# Patient Record
Sex: MALE | Race: WHITE | Employment: OTHER | URBAN - NONMETROPOLITAN AREA
[De-identification: names, ages, dates, MRNs, and addresses within clinical notes are randomized per-mention and may not be internally consistent; named-entity substitution may affect disease eponyms.]

---

## 2019-05-31 ENCOUNTER — OFFICE VISIT (OUTPATIENT)
Dept: FAMILY MEDICINE CLINIC | Age: 69
End: 2019-05-31
Payer: MEDICARE

## 2019-05-31 VITALS
HEIGHT: 75 IN | TEMPERATURE: 98.7 F | SYSTOLIC BLOOD PRESSURE: 122 MMHG | BODY MASS INDEX: 28.89 KG/M2 | OXYGEN SATURATION: 99 % | WEIGHT: 232.38 LBS | DIASTOLIC BLOOD PRESSURE: 80 MMHG | HEART RATE: 60 BPM

## 2019-05-31 DIAGNOSIS — L03.115 CELLULITIS OF RIGHT LOWER EXTREMITY: Primary | ICD-10-CM

## 2019-05-31 PROCEDURE — 99213 OFFICE O/P EST LOW 20 MIN: CPT | Performed by: FAMILY MEDICINE

## 2019-05-31 RX ORDER — LOSARTAN POTASSIUM 50 MG/1
TABLET ORAL
COMMUNITY
Start: 2019-05-22

## 2019-05-31 RX ORDER — ATORVASTATIN CALCIUM 10 MG/1
TABLET, FILM COATED ORAL
COMMUNITY
Start: 2019-03-15

## 2019-05-31 RX ORDER — PANTOPRAZOLE SODIUM 40 MG/1
TABLET, DELAYED RELEASE ORAL
COMMUNITY
Start: 2019-03-15

## 2019-05-31 RX ORDER — CEPHALEXIN 500 MG/1
500 CAPSULE ORAL 3 TIMES DAILY
Qty: 30 CAPSULE | Refills: 0 | Status: SHIPPED | OUTPATIENT
Start: 2019-05-31 | End: 2019-06-10

## 2019-05-31 NOTE — PROGRESS NOTES
5/31/19    Name: Edd Sni Work  YDW:[id number]   Sex:male   Age:69 y.o. Subjective:  Chief Complaint   Patient presents with    Leg Injury     skin tear/abrasion to right lower leg a week ago. not healing     Patient pressents with cut on right lower leg that was healing then he bumped it and re opened it and proceeded to use a new set of bandaids and after just the first bandaid he now has a red square around the wound from it  But leg is now getting red as well around where the initial wound is  He also has noticed it is more sore this morning    ROS:    As above, all other pertinent systems negative. Current Outpatient Medications:     cephALEXin (KEFLEX) 500 MG capsule, Take 1 capsule by mouth 3 times daily for 10 days, Disp: 30 capsule, Rfl: 0    pantoprazole (PROTONIX) 40 MG tablet, , Disp: , Rfl:     losartan (COZAAR) 50 MG tablet, , Disp: , Rfl:     atorvastatin (LIPITOR) 10 MG tablet, , Disp: , Rfl:   No Known Allergies     /80   Pulse 60   Temp 98.7 °F (37.1 °C)   Ht 6' 3\" (1.905 m)   Wt 232 lb 6 oz (105.4 kg)   SpO2 99%   BMI 29.04 kg/m²     EXAM:   Physical Exam   Constitutional: He appears well-developed and well-nourished. HENT:   Head: Normocephalic and atraumatic. Eyes: Pupils are equal, round, and reactive to light. EOM are normal.   Neck: Normal range of motion. Cardiovascular: Normal rate and regular rhythm. Pulmonary/Chest: Effort normal and breath sounds normal.   Skin: Skin is warm and dry. Right lower leg wound about size of viridiana that kings healing but red. Red outline square around the wound  Tenderness in the area  Mild swellign as well   Nursing note and vitals reviewed. Edd Sin was seen today for leg injury. Diagnoses and all orders for this visit:    Cellulitis of right lower extremity  -     cephALEXin (KEFLEX) 500 MG capsule;  Take 1 capsule by mouth 3 times daily for 10 days    keep open to heal  Elevate leg  If not feeling better in 2 to 3 days needs to follow up        Seen by:   Pratima Ye, DO

## 2019-07-01 ENCOUNTER — OFFICE VISIT (OUTPATIENT)
Dept: FAMILY MEDICINE CLINIC | Age: 69
End: 2019-07-01
Payer: MEDICARE

## 2019-07-01 VITALS
SYSTOLIC BLOOD PRESSURE: 124 MMHG | HEIGHT: 75 IN | OXYGEN SATURATION: 96 % | BODY MASS INDEX: 29.09 KG/M2 | TEMPERATURE: 97.7 F | DIASTOLIC BLOOD PRESSURE: 80 MMHG | WEIGHT: 234 LBS | HEART RATE: 72 BPM

## 2019-07-01 DIAGNOSIS — R73.9 HYPERGLYCEMIA: ICD-10-CM

## 2019-07-01 DIAGNOSIS — B35.3 TINEA PEDIS OF BOTH FEET: ICD-10-CM

## 2019-07-01 LAB
CHP ED QC CHECK: YES
GLUCOSE BLD-MCNC: 148 MG/DL

## 2019-07-01 PROCEDURE — 82962 GLUCOSE BLOOD TEST: CPT | Performed by: FAMILY MEDICINE

## 2019-07-01 PROCEDURE — 99213 OFFICE O/P EST LOW 20 MIN: CPT | Performed by: FAMILY MEDICINE

## 2019-07-01 RX ORDER — TERBINAFINE HYDROCHLORIDE 250 MG/1
250 TABLET ORAL DAILY
Qty: 14 TABLET | Refills: 0 | Status: SHIPPED | OUTPATIENT
Start: 2019-07-01 | End: 2019-07-15

## 2019-07-01 NOTE — PROGRESS NOTES
OFFICE NOTE    7/1/19  Name: Aman Vogt Work  APV:8[id number]   Sex:male   Age:69 y.o. SUBJECTIVE  Chief Complaint   Patient presents with    Rash     both feet - started about a month ago        HPI  Rash on feet for about a mos. Not pruritic, Having knee replacement in about a mos thinks this might cause them to cancel surgery        Review of Systems   No claudication symptoms. Not diabetic. No fever or chills        Current Outpatient Medications:     pantoprazole (PROTONIX) 40 MG tablet, , Disp: , Rfl:     losartan (COZAAR) 50 MG tablet, , Disp: , Rfl:     atorvastatin (LIPITOR) 10 MG tablet, , Disp: , Rfl:   No Known Allergies    No past medical history on file. No past surgical history on file. No family history on file. Social History     Tobacco History     Smoking Status  Never Smoker    Smokeless Tobacco Use  Never Used          Alcohol History     Alcohol Use Status  Not Asked          Drug Use     Drug Use Status  Not Asked          Sexual Activity     Sexually Active  Not Asked                OBJECTIVE  Vitals:    07/01/19 1309   BP: 124/80   Pulse: 72   Temp: 97.7 °F (36.5 °C)   SpO2: 96%   Weight: 234 lb (106.1 kg)   Height: 6' 3\" (1.905 m)        Body mass index is 29.25 kg/m². Patient is overweight    No orders of the defined types were placed in this encounter. EXAM   Physical Exam   Cardiovascular: Normal rate and regular rhythm. Pulmonary/Chest: Effort normal and breath sounds normal.   Musculoskeletal: Normal range of motion. Advanced OA both knees. Purplish discoloration of toes and distal fee. Pulses diminished, cap filling normal, tinea pedis evident         Aman Vogt was seen today for rash. Diagnoses and all orders for this visit:    Tinea pedis of both feet  Looks like severe tinea pedis. Lamisil 200 mg per day for 2 weeks and podiatry referral. On atorvastatin will hold while on Lamisil. Hyperglycemia  Random  may be early DM.  Will need to f/u with PMD for this          No follow-ups on file.     Electronically signed by Ajith Gallardo MD on 7/1/19 at 1:23 PM

## 2019-07-02 ENCOUNTER — OFFICE VISIT (OUTPATIENT)
Dept: PODIATRY | Age: 69
End: 2019-07-02
Payer: MEDICARE

## 2019-07-02 VITALS — BODY MASS INDEX: 28.97 KG/M2 | WEIGHT: 233 LBS | RESPIRATION RATE: 20 BRPM | HEIGHT: 75 IN

## 2019-07-02 DIAGNOSIS — L23.89 ALLERGIC CONTACT DERMATITIS DUE TO FOOTWEAR: ICD-10-CM

## 2019-07-02 DIAGNOSIS — B35.3 TINEA PEDIS OF BOTH FEET: Primary | ICD-10-CM

## 2019-07-02 PROCEDURE — 99203 OFFICE O/P NEW LOW 30 MIN: CPT | Performed by: PODIATRIST

## 2019-07-02 RX ORDER — PRENATAL VIT 91/IRON/FOLIC/DHA 28-975-200
COMBINATION PACKAGE (EA) ORAL
Qty: 1 TUBE | Refills: 2 | Status: SHIPPED | OUTPATIENT
Start: 2019-07-02 | End: 2019-09-24

## 2019-07-02 RX ORDER — BETAMETHASONE DIPROPIONATE 0.5 MG/G
CREAM TOPICAL
Qty: 1 TUBE | Refills: 2 | Status: SHIPPED | OUTPATIENT
Start: 2019-07-02 | End: 2019-09-24

## 2019-07-23 ENCOUNTER — PROCEDURE VISIT (OUTPATIENT)
Dept: PODIATRY | Age: 69
End: 2019-07-23
Payer: MEDICARE

## 2019-07-23 VITALS — BODY MASS INDEX: 28.97 KG/M2 | RESPIRATION RATE: 18 BRPM | WEIGHT: 233 LBS | HEIGHT: 75 IN

## 2019-07-23 DIAGNOSIS — L23.89 ALLERGIC CONTACT DERMATITIS DUE TO FOOTWEAR: ICD-10-CM

## 2019-07-23 DIAGNOSIS — B35.3 TINEA PEDIS OF BOTH FEET: Primary | ICD-10-CM

## 2019-07-23 DIAGNOSIS — B35.1 TINEA UNGUIUM: ICD-10-CM

## 2019-07-23 PROCEDURE — 99213 OFFICE O/P EST LOW 20 MIN: CPT | Performed by: PODIATRIST

## 2019-07-23 RX ORDER — BETAMETHASONE DIPROPIONATE 0.5 MG/G
CREAM TOPICAL
Qty: 1 TUBE | Refills: 2 | Status: SHIPPED | OUTPATIENT
Start: 2019-07-23 | End: 2019-12-03

## 2019-07-23 RX ORDER — PRENATAL VIT 91/IRON/FOLIC/DHA 28-975-200
COMBINATION PACKAGE (EA) ORAL
Qty: 1 TUBE | Refills: 2 | Status: SHIPPED | OUTPATIENT
Start: 2019-07-23 | End: 2019-09-24

## 2019-07-23 RX ORDER — LEVOCETIRIZINE DIHYDROCHLORIDE 5 MG/1
TABLET, FILM COATED ORAL
COMMUNITY

## 2019-09-24 ENCOUNTER — OFFICE VISIT (OUTPATIENT)
Dept: PODIATRY | Age: 69
End: 2019-09-24
Payer: MEDICARE

## 2019-09-24 VITALS
HEIGHT: 75 IN | TEMPERATURE: 97 F | HEART RATE: 87 BPM | BODY MASS INDEX: 28.6 KG/M2 | WEIGHT: 230 LBS | OXYGEN SATURATION: 97 %

## 2019-09-24 DIAGNOSIS — B35.3 TINEA PEDIS OF BOTH FEET: ICD-10-CM

## 2019-09-24 DIAGNOSIS — L84 CORNS AND CALLOSITIES: ICD-10-CM

## 2019-09-24 DIAGNOSIS — G60.8 HEREDITARY SENSORY NEUROPATHY: ICD-10-CM

## 2019-09-24 DIAGNOSIS — B35.1 TINEA UNGUIUM: Primary | ICD-10-CM

## 2019-09-24 PROCEDURE — 11721 DEBRIDE NAIL 6 OR MORE: CPT | Performed by: PODIATRIST

## 2019-09-24 PROCEDURE — 11056 PARNG/CUTG B9 HYPRKR LES 2-4: CPT | Performed by: PODIATRIST

## 2019-09-24 PROCEDURE — 99999 PR OFFICE/OUTPT VISIT,PROCEDURE ONLY: CPT | Performed by: PODIATRIST

## 2019-09-24 NOTE — PROGRESS NOTES
Kiko Work is here today for nail care. his PCP is Omega Reagan MD last OV was  19.        19  Kiko Work : 1950 Sex: male  Age: 78 y.o. Patient was referred by Omega Reagan MD    CC:    Follow-up Red rash both right and left feet  Follow-up painful elongated toenails 1 through 5 right and left    HPI:   Follow-up tinea pedis bilateral plantar feet. Follow-up painful elongated toenails 1 through 5 right and left  Denies any open wounds. Has been progressing very well for left total knee replacement when she had this past July scheduled for total knee replacement right knee in the next month. Denies any foot or ankle pain today. No additional pedal complaints at this time. ROS:  Const: Denies constitutional symptoms  Musculo: Denies symptoms other than stated above  Skin: Denies symptoms other than stated above       Current Outpatient Medications:     levocetirizine (XYZAL) 5 MG tablet, Take by mouth, Disp: , Rfl:     betamethasone dipropionate (DIPROLENE) 0.05 % cream, Apply to affected area topically 2 times daily. , Disp: 1 Tube, Rfl: 2    pantoprazole (PROTONIX) 40 MG tablet, , Disp: , Rfl:     losartan (COZAAR) 50 MG tablet, , Disp: , Rfl:     atorvastatin (LIPITOR) 10 MG tablet, , Disp: , Rfl:   No Known Allergies    No past medical history on file. Vitals:    19 0816   Pulse: 87   Temp: 97 °F (36.1 °C)   TempSrc: Temporal   SpO2: 97%   Weight: 230 lb (104.3 kg)   Height: 6' 3\" (1.905 m)       Work History/Social History: [facility] Dr. Cheryl Brewer left 2019/ 2019. [place]    Focused Lower Extremity Physical Exam:    Neurovascular examination:    Dorsalis Pedis palpable bilateral.  Posterior tibialis palpable bilateral.    Capillary Refill Time:  Immediate return  Hair growth:  Symmetrical and bilateral   Skin:  Not atrophic  Edema: No edema bilateral feet or ankles.   Neurologic:  Light touch intact bilateral.      Musculoskeletal/ Orthopedic examination:

## 2019-12-03 ENCOUNTER — PROCEDURE VISIT (OUTPATIENT)
Dept: PODIATRY | Age: 69
End: 2019-12-03
Payer: MEDICARE

## 2019-12-03 DIAGNOSIS — B35.1 TINEA UNGUIUM: Primary | ICD-10-CM

## 2019-12-03 DIAGNOSIS — L84 CORNS AND CALLOSITIES: ICD-10-CM

## 2019-12-03 DIAGNOSIS — G60.8 HEREDITARY SENSORY NEUROPATHY: ICD-10-CM

## 2019-12-03 PROCEDURE — 11721 DEBRIDE NAIL 6 OR MORE: CPT | Performed by: PODIATRIST

## 2019-12-03 PROCEDURE — 11056 PARNG/CUTG B9 HYPRKR LES 2-4: CPT | Performed by: PODIATRIST

## 2020-05-27 ENCOUNTER — PROCEDURE VISIT (OUTPATIENT)
Dept: PODIATRY | Age: 70
End: 2020-05-27
Payer: MEDICARE

## 2020-05-27 VITALS — BODY MASS INDEX: 29.22 KG/M2 | WEIGHT: 235 LBS | HEIGHT: 75 IN

## 2020-05-27 PROCEDURE — 11721 DEBRIDE NAIL 6 OR MORE: CPT | Performed by: PODIATRIST

## 2020-05-27 PROCEDURE — 11056 PARNG/CUTG B9 HYPRKR LES 2-4: CPT | Performed by: PODIATRIST

## 2020-07-29 ENCOUNTER — PROCEDURE VISIT (OUTPATIENT)
Dept: PODIATRY | Age: 70
End: 2020-07-29
Payer: MEDICARE

## 2020-07-29 PROCEDURE — 11721 DEBRIDE NAIL 6 OR MORE: CPT | Performed by: PODIATRIST

## 2020-07-29 PROCEDURE — 11056 PARNG/CUTG B9 HYPRKR LES 2-4: CPT | Performed by: PODIATRIST

## 2020-11-10 ENCOUNTER — PROCEDURE VISIT (OUTPATIENT)
Dept: PODIATRY | Age: 70
End: 2020-11-10
Payer: MEDICARE

## 2020-11-10 PROCEDURE — 11056 PARNG/CUTG B9 HYPRKR LES 2-4: CPT | Performed by: PODIATRIST

## 2020-11-10 PROCEDURE — 11721 DEBRIDE NAIL 6 OR MORE: CPT | Performed by: PODIATRIST

## 2020-11-10 NOTE — PROGRESS NOTES
lesions. There is also hyperkeratotic tissue medial IPJ great toe bilateral.    Assessment and Plan:  Shyla Jackson was seen today for callouses and nail problem. Diagnoses and all orders for this visit:    Tinea unguium    Corns and callosities    Hereditary sensory neuropathy    Tinea pedis of both feet          Patient seen follow-up today for painful elongated toenails 1 through 5 right and left  Manual debridement with standard technique toenails 1 through 5 right and left in thickness and length. Patient tolerated procedure well. Paring of hyperkeratotic tissue with #15 blade plantar 1st metatarsal head bilateral.  I did also pare hyperkeratotic tissue medial IPJ great toe bilateral.  Patient tolerated well. Did recommend daily lotion. Importance avoiding barefoot. I will follow-up 2 months. Return in about 2 months (around 1/10/2021). Seen By:  Mayur Alaniz DPM      Document was created using voice recognition software. Note was reviewed, however may contain grammatical errors.

## 2021-01-12 ENCOUNTER — PROCEDURE VISIT (OUTPATIENT)
Dept: PODIATRY | Age: 71
End: 2021-01-12
Payer: MEDICARE

## 2021-01-12 DIAGNOSIS — B35.1 TINEA UNGUIUM: Primary | ICD-10-CM

## 2021-01-12 DIAGNOSIS — G60.8 HEREDITARY SENSORY NEUROPATHY: ICD-10-CM

## 2021-01-12 DIAGNOSIS — L84 CORNS AND CALLOSITIES: ICD-10-CM

## 2021-01-12 DIAGNOSIS — B35.3 TINEA PEDIS OF BOTH FEET: ICD-10-CM

## 2021-01-12 PROCEDURE — 11056 PARNG/CUTG B9 HYPRKR LES 2-4: CPT | Performed by: PODIATRIST

## 2021-01-12 PROCEDURE — 11721 DEBRIDE NAIL 6 OR MORE: CPT | Performed by: PODIATRIST

## 2021-01-12 NOTE — PROGRESS NOTES
Finn Dow Work is here today for nail care. his PCP is Verna Bhatti MD last OV was 20. Finn Dow Work : 1950 Sex: male  Age: 69 y.o. Patient was referred by Verna Bhatti MD    CC:    Follow-up painful elongated toenails 1 through 5 right and left    HPI:   Follow-up painful elongated toenails 1 through 5 right and left  Denies any foot or ankle pain today. Does have dry skin on the bottom both feet with callus tissue. No additional pedal complaints. ROS:  Const: Denies constitutional symptoms  Musculo: Denies symptoms other than stated above  Skin: Denies symptoms other than stated above       Current Outpatient Medications:     levocetirizine (XYZAL) 5 MG tablet, Take by mouth, Disp: , Rfl:     pantoprazole (PROTONIX) 40 MG tablet, , Disp: , Rfl:     losartan (COZAAR) 50 MG tablet, , Disp: , Rfl:     atorvastatin (LIPITOR) 10 MG tablet, , Disp: , Rfl:   No Known Allergies    No past medical history on file. There were no vitals filed for this visit. Orthopedic history: [facility] Dr. Xiomara Lorenzo left 2019/ 2019. Focused Lower Extremity Physical Exam:    Neurovascular examination:    Dorsalis Pedis palpable bilateral.  Posterior tibialis palpable bilateral.    Capillary Refill Time:  Immediate return  Hair growth:  Symmetrical and bilateral   Skin:  Not atrophic  Edema: No edema bilateral feet or ankles. Neurologic:  Light touch intact bilateral.      Musculoskeletal/ Orthopedic examination:    Equinis: Absent bilateral  Dorsiflexion, plantarflexion, inversion, eversion bilateral 5 out of 5 muscle strength  Wiggling toes  Negative Homans  Mild tenderness all toenails today. Dermatology examination:      Toenails 1-5 right and left thickened, elongated, dystrophic, mycotic with subungual debris. Webspaces 1-4 bilateral clean, dry and intact. Hyperkeratotic tissue plantar first metatarsal head bilateral  Dry flaky skin plantar feet bilateral  No open skin lesions. Assessment and Plan:  LYNDSAY was seen today for callouses and nail problem. Diagnoses and all orders for this visit:    Tinea unguium    Corns and callosities    Hereditary sensory neuropathy    Tinea pedis of both feet          Follow-up painful elongated toenails 1 through 5 right and left  Manual debridement with standard technique toenails 1 through 5 right and left in thickness and length. Patient tolerated procedure well. Paring of hyperkeratotic tissue with #15 blade plantar 1st metatarsal head bilateral.  Paring hyperkeratotic tissue IPJ great toe bilateral.    Avoid barefoot. Continue daily lotion. I will follow-up 2 months. Return in about 2 months (around 3/12/2021). Seen By:  Jason Adams DPM      Document was created using voice recognition software. Note was reviewed, however may contain grammatical errors.

## 2021-03-16 ENCOUNTER — OFFICE VISIT (OUTPATIENT)
Dept: PODIATRY | Age: 71
End: 2021-03-16
Payer: MEDICARE

## 2021-03-16 VITALS — HEIGHT: 75 IN | BODY MASS INDEX: 29.22 KG/M2 | WEIGHT: 235 LBS

## 2021-03-16 DIAGNOSIS — L84 CORNS AND CALLOSITIES: ICD-10-CM

## 2021-03-16 DIAGNOSIS — B35.3 TINEA PEDIS OF BOTH FEET: ICD-10-CM

## 2021-03-16 DIAGNOSIS — G60.8 HEREDITARY SENSORY NEUROPATHY: ICD-10-CM

## 2021-03-16 DIAGNOSIS — B35.1 TINEA UNGUIUM: Primary | ICD-10-CM

## 2021-03-16 PROCEDURE — 11056 PARNG/CUTG B9 HYPRKR LES 2-4: CPT | Performed by: PODIATRIST

## 2021-03-16 PROCEDURE — 11721 DEBRIDE NAIL 6 OR MORE: CPT | Performed by: PODIATRIST

## 2021-03-16 NOTE — PROGRESS NOTES
Chyna Nix Work is here today for nail care. his PCP is Jenna Farr MD last OV was 03/10/21. Chyna Nix Work : 1950 Sex: male  Age: 66 y.o. Patient was referred by Jenna Farr MD    CC:    Follow-up painful elongated toenails 1 through 5 right and left    HPI:   Follow-up painful elongated toenails 1 through 5 right and left    Does have callus tissue on the bottom of both feet. No calf pain. No open skin lesion abrasions. No additional pedal complaints. ROS:  Const: Denies constitutional symptoms  Musculo: Denies symptoms other than stated above  Skin: Denies symptoms other than stated above       Current Outpatient Medications:     levocetirizine (XYZAL) 5 MG tablet, Take by mouth, Disp: , Rfl:     pantoprazole (PROTONIX) 40 MG tablet, , Disp: , Rfl:     losartan (COZAAR) 50 MG tablet, , Disp: , Rfl:     atorvastatin (LIPITOR) 10 MG tablet, , Disp: , Rfl:   No Known Allergies    No past medical history on file. Vitals:    21 1019   Weight: 235 lb (106.6 kg)   Height: 6' 3\" (1.905 m)       Orthopedic history: [facility] Dr. Pili Sweeney left 2019/ 2019. Focused Lower Extremity Physical Exam:    Neurovascular examination:    Dorsalis Pedis palpable bilateral.  Posterior tibialis palpable bilateral.    Capillary Refill Time:  Immediate return  Hair growth:  Symmetrical and bilateral   Skin:  Not atrophic  Edema: No edema bilateral feet or ankles. Neurologic:  Light touch intact bilateral.      Musculoskeletal/ Orthopedic examination:    Equinis: Absent bilateral  Dorsiflexion, plantarflexion, inversion, eversion bilateral 5 out of 5 muscle strength  Wiggling toes  Negative Homans. No significant tenderness to palpation plantar first metatarsal bilateral.    Dermatology examination:      Toenails 1-5 right and left thickened, elongated, dystrophic, mycotic with subungual debris. Webspaces 1-4 bilateral clean, dry and intact.    Hyperkeratotic tissue noted plantar first metatarsal head bilateral.  There is also hyperkeratotic tissue medial IPJ great toe bilateral.      Assessment and Plan:  Manasa Matt was seen today for callouses and nail problem. Diagnoses and all orders for this visit:    Tinea unguium    Corns and callosities    Hereditary sensory neuropathy    Tinea pedis of both feet          Follow-up painful elongated toenails 1 through 5 right and left  Manual debridement with standard technique toenails 1 through 5 right and left in thickness and length. Patient tolerated procedure well. Paring of hyperkeratotic tissue with #15 blade plantar 1st metatarsal head bilateral and medial IPJ great toe bilateral.    Avoid barefoot. Continue lotion daily. Follow-up 2 months. No follow-ups on file. Seen By:  Wade Rosen DPM      Document was created using voice recognition software. Note was reviewed, however may contain grammatical errors.

## 2021-05-18 ENCOUNTER — PROCEDURE VISIT (OUTPATIENT)
Dept: PODIATRY | Age: 71
End: 2021-05-18
Payer: MEDICARE

## 2021-05-18 VITALS — HEIGHT: 75 IN | WEIGHT: 235 LBS | BODY MASS INDEX: 29.22 KG/M2

## 2021-05-18 DIAGNOSIS — G60.8 HEREDITARY SENSORY NEUROPATHY: ICD-10-CM

## 2021-05-18 DIAGNOSIS — B35.1 TINEA UNGUIUM: Primary | ICD-10-CM

## 2021-05-18 DIAGNOSIS — L84 CORNS AND CALLOSITIES: ICD-10-CM

## 2021-05-18 DIAGNOSIS — B35.3 TINEA PEDIS OF BOTH FEET: ICD-10-CM

## 2021-05-18 PROCEDURE — 11056 PARNG/CUTG B9 HYPRKR LES 2-4: CPT | Performed by: PODIATRIST

## 2021-05-18 PROCEDURE — 11721 DEBRIDE NAIL 6 OR MORE: CPT | Performed by: PODIATRIST

## 2021-05-18 RX ORDER — AMOXICILLIN AND CLAVULANATE POTASSIUM 875; 125 MG/1; MG/1
TABLET, FILM COATED ORAL
COMMUNITY
Start: 2021-05-17 | End: 2021-07-20

## 2021-05-18 RX ORDER — LORAZEPAM 0.5 MG/1
TABLET ORAL
COMMUNITY
Start: 2021-03-09

## 2021-05-18 NOTE — PROGRESS NOTES
Carter Meier Work is here today for nail care. his PCP is Natividad Meyers MD last OV was 21. Carter Meier Work : 1950 Sex: male  Age: 66 y.o. Patient was referred by Natividad Meyers MD    CC:    Follow-up painful elongated toenails 1 through 5 right and left    HPI:   Follow-up painful elongated toenails 1 through 5 right and left  Denies any open skin lesions or abrasions. No calf pain. No recent foot or ankle injuries. Denies any additional pedal complaints today. ROS:  Const: Denies constitutional symptoms  Musculo: Denies symptoms other than stated above  Skin: Denies symptoms other than stated above       Current Outpatient Medications:     amoxicillin-clavulanate (AUGMENTIN) 875-125 MG per tablet, , Disp: , Rfl:     diclofenac (VOLTAREN) 50 MG EC tablet, , Disp: , Rfl:     LORazepam (ATIVAN) 0.5 MG tablet, TAKE 1 TABLET BY MOUTH EVERY DAY, Disp: , Rfl:     levocetirizine (XYZAL) 5 MG tablet, Take by mouth, Disp: , Rfl:     pantoprazole (PROTONIX) 40 MG tablet, , Disp: , Rfl:     losartan (COZAAR) 50 MG tablet, , Disp: , Rfl:     atorvastatin (LIPITOR) 10 MG tablet, , Disp: , Rfl:   No Known Allergies    No past medical history on file. Vitals:    21 1031   Weight: 235 lb (106.6 kg)   Height: 6' 3\" (1.905 m)       Orthopedic history: TKA Dr. Good Bal left 2019/ 2019. Focused Lower Extremity Physical Exam:    Neurovascular examination:    Dorsalis Pedis palpable bilateral.  Posterior tibialis palpable bilateral.    Capillary Refill Time:  Immediate return  Hair growth:  Symmetrical and bilateral   Skin:  Not atrophic  Edema: No edema bilateral feet or ankles. Neurologic:  Light touch intact bilateral.      Musculoskeletal/ Orthopedic examination:    Equinis: Absent bilateral  Dorsiflexion, plantarflexion, inversion, eversion bilateral 5 out of 5 muscle strength  Wiggling toes  Negative Homans.   No significant tenderness to palpation plantar first metatarsal bilateral.    Dermatology examination:      Toenails 1-5 right and left thickened, elongated, dystrophic, mycotic with subungual debris. Webspaces 1-4 bilateral clean, dry and intact. Hyperkeratotic tissue noted plantar first metatarsal head bilateral.    Mild hyperkeratotic tissue IPJ great toe bilateral      Assessment and Plan:  Yaritza Contreras was seen today for callouses and nail problem. Diagnoses and all orders for this visit:    Tinea unguium    Corns and callosities    Hereditary sensory neuropathy    Tinea pedis of both feet          Follow-up painful elongated toenails 1 through 5 right and left  Manual debridement with standard technique toenails 1 through 5 right and left in thickness and length. Patient tolerated procedure well. Paring of hyperkeratotic tissue with #15 blade plantar 1st metatarsal head bilateral and medial IPJ great toe bilateral.    Follow-up 2 months              Return in about 2 months (around 7/18/2021). Seen By:  Sangeetha Leal DPM      Document was created using voice recognition software. Note was reviewed, however may contain grammatical errors.

## 2021-07-20 ENCOUNTER — PROCEDURE VISIT (OUTPATIENT)
Dept: PODIATRY | Age: 71
End: 2021-07-20
Payer: MEDICARE

## 2021-07-20 VITALS — HEIGHT: 75 IN | WEIGHT: 235 LBS | BODY MASS INDEX: 29.22 KG/M2

## 2021-07-20 DIAGNOSIS — L84 CORNS AND CALLOSITIES: ICD-10-CM

## 2021-07-20 DIAGNOSIS — G60.8 HEREDITARY SENSORY NEUROPATHY: ICD-10-CM

## 2021-07-20 DIAGNOSIS — B35.3 TINEA PEDIS OF BOTH FEET: ICD-10-CM

## 2021-07-20 DIAGNOSIS — B35.1 TINEA UNGUIUM: Primary | ICD-10-CM

## 2021-07-20 PROCEDURE — 11721 DEBRIDE NAIL 6 OR MORE: CPT | Performed by: PODIATRIST

## 2021-07-20 PROCEDURE — 11056 PARNG/CUTG B9 HYPRKR LES 2-4: CPT | Performed by: PODIATRIST

## 2021-07-20 NOTE — PROGRESS NOTES
Shyla Jackson Work is here today for nail care. his PCP is Pierce Aguero MD last OV was 21. Shyla Jackson Work : 1950 Sex: male  Age: 66 y.o. Patient was referred by Pierce Aguero MD    CC:    Follow-up painful elongated toenails 1 through 5 right and left    HPI:   Follow-up painful elongated toenails 1 through 5 right and left  No open skin lesions or abrasions. Here today foot ankle exam.  No additional pedal complaints. No calf pain. ROS:  Const: Denies constitutional symptoms  Musculo: Denies symptoms other than stated above  Skin: Denies symptoms other than stated above       Current Outpatient Medications:     diclofenac (VOLTAREN) 50 MG EC tablet, , Disp: , Rfl:     LORazepam (ATIVAN) 0.5 MG tablet, TAKE 1 TABLET BY MOUTH EVERY DAY, Disp: , Rfl:     levocetirizine (XYZAL) 5 MG tablet, Take by mouth, Disp: , Rfl:     pantoprazole (PROTONIX) 40 MG tablet, , Disp: , Rfl:     losartan (COZAAR) 50 MG tablet, , Disp: , Rfl:     atorvastatin (LIPITOR) 10 MG tablet, , Disp: , Rfl:   No Known Allergies    No past medical history on file. Vitals:    21 1034   Weight: 235 lb (106.6 kg)   Height: 6' 3\" (1.905 m)       Orthopedic history: TKA Dr. Nettie Glynn left 2019/ 2019. Focused Lower Extremity Physical Exam:    Neurovascular examination:    Dorsalis Pedis palpable bilateral.  Posterior tibialis palpable bilateral.    Capillary Refill Time:  Immediate return  Hair growth:  Symmetrical and bilateral   Skin:  Not atrophic  Edema: No edema bilateral feet or ankles. Neurologic:  Light touch intact bilateral.      Musculoskeletal/ Orthopedic examination:    Equinis: Absent bilateral  Dorsiflexion, plantarflexion, inversion, eversion bilateral 5 out of 5 muscle strength  Wiggling toes  Negative Homans    Dermatology examination:      Toenails 1-5 right and left thickened, elongated, dystrophic, mycotic with subungual debris. Webspaces 1-4 bilateral clean, dry and intact. Hyperkeratotic tissue noted plantar first metatarsal head bilateral.    Hyperkeratotic tissue IP J great toe bilateral.  No open skin lesions or abrasions. Assessment and Plan:  Silvio Coombs was seen today for callouses and nail problem. Diagnoses and all orders for this visit:    Tinea unguium    Corns and callosities    Hereditary sensory neuropathy    Tinea pedis of both feet        Follow-up painful elongated toenails 1 through 5 right and left    Manual debridement with standard technique toenails 1 through 5 right and left in thickness and length. Patient tolerated procedure well. Paring of hyperkeratotic tissue with #15 blade plantar 1st metatarsal head bilateral and medial IPJ great toe bilateral.    Follow-up 2 months                No follow-ups on file. Seen By:  Kishan Dimas DPM      Document was created using voice recognition software. Note was reviewed, however may contain grammatical errors.

## 2021-09-21 ENCOUNTER — PROCEDURE VISIT (OUTPATIENT)
Dept: PODIATRY | Age: 71
End: 2021-09-21
Payer: MEDICARE

## 2021-09-21 VITALS — HEIGHT: 75 IN | BODY MASS INDEX: 28.6 KG/M2 | WEIGHT: 230 LBS

## 2021-09-21 DIAGNOSIS — G60.8 HEREDITARY SENSORY NEUROPATHY: ICD-10-CM

## 2021-09-21 DIAGNOSIS — B35.1 TINEA UNGUIUM: Primary | ICD-10-CM

## 2021-09-21 DIAGNOSIS — B35.3 TINEA PEDIS OF BOTH FEET: ICD-10-CM

## 2021-09-21 DIAGNOSIS — L84 CORNS AND CALLOSITIES: ICD-10-CM

## 2021-09-21 PROCEDURE — 11056 PARNG/CUTG B9 HYPRKR LES 2-4: CPT | Performed by: PODIATRIST

## 2021-09-21 PROCEDURE — 11721 DEBRIDE NAIL 6 OR MORE: CPT | Performed by: PODIATRIST

## 2021-09-21 NOTE — PROGRESS NOTES
Sara Boswell Work is here today for nail care. his PCP is Walter Reed MD last OV was 2021. Sara Boswell Work : 1950 Sex: male  Age: 66 y.o. Patient was referred by Walter Reed MD    CC:    Follow-up painful elongated toenails 1 through 5 right and left    HPI:   Follow-up painful elongated toenails 1 through 5 right and left  Progressing well from foot ankle standpoint. No open skin lesion abrasions. No additional pedal complaints this time. ROS:  Const: Denies constitutional symptoms  Musculo: Denies symptoms other than stated above  Skin: Denies symptoms other than stated above       Current Outpatient Medications:     diclofenac (VOLTAREN) 50 MG EC tablet, , Disp: , Rfl:     LORazepam (ATIVAN) 0.5 MG tablet, TAKE 1 TABLET BY MOUTH EVERY DAY, Disp: , Rfl:     levocetirizine (XYZAL) 5 MG tablet, Take by mouth, Disp: , Rfl:     pantoprazole (PROTONIX) 40 MG tablet, , Disp: , Rfl:     losartan (COZAAR) 50 MG tablet, , Disp: , Rfl:     atorvastatin (LIPITOR) 10 MG tablet, , Disp: , Rfl:   No Known Allergies    No past medical history on file. Vitals:    21 1021   Weight: 230 lb (104.3 kg)   Height: 6' 3\" (1.905 m)       Orthopedic history: T[facility] Dr. Sigrid Beltre left 2019/ 2019. Focused Lower Extremity Physical Exam:    Neurovascular examination:    Dorsalis Pedis palpable bilateral.  Posterior tibialis palpable bilateral.    Capillary Refill Time:  Immediate return  Hair growth:  Symmetrical and bilateral   Skin:  Not atrophic  Edema: No edema bilateral feet or ankles. Neurologic:  Light touch intact bilateral.      Musculoskeletal/ Orthopedic examination:    Equinis: Absent bilateral  Dorsiflexion, plantarflexion, inversion, eversion bilateral 5 out of 5 muscle strength  Wiggling toes  Negative Homans    Dermatology examination:      Toenails 1-5 right and left thickened, elongated, dystrophic, mycotic with subungual debris.   Webspaces 1-4 bilateral clean, dry and intact. Hyperkeratotic tissue noted plantar first metatarsal head bilateral.      No erythema. No open wounds. Assessment and Plan:  Renée Rivera was seen today for callouses and nail problem. Diagnoses and all orders for this visit:    Tinea unguium    Corns and callosities    Hereditary sensory neuropathy    Tinea pedis of both feet        Follow-up painful elongated toenails 1 through 5 right and left    Manual debridement with standard technique toenails 1 through 5 right and left in thickness and length. Patient tolerated procedure well. Paring of hyperkeratotic tissue with #15 blade plantar 1st metatarsal head bilateral and medial IPJ great toe bilateral.    Continue lotion daily on bottom both feet. Avoid barefoot. Follow-up 3 months. Return in about 2 months (around 11/21/2021). Seen By:  Cynthia Shukla DPM      Document was created using voice recognition software. Note was reviewed, however may contain grammatical errors.

## 2021-12-03 ENCOUNTER — PROCEDURE VISIT (OUTPATIENT)
Dept: PODIATRY | Age: 71
End: 2021-12-03
Payer: MEDICARE

## 2021-12-03 DIAGNOSIS — G60.8 HEREDITARY SENSORY NEUROPATHY: ICD-10-CM

## 2021-12-03 DIAGNOSIS — B35.1 TINEA UNGUIUM: Primary | ICD-10-CM

## 2021-12-03 DIAGNOSIS — L84 CORNS AND CALLOSITIES: ICD-10-CM

## 2021-12-03 DIAGNOSIS — B35.3 TINEA PEDIS OF BOTH FEET: ICD-10-CM

## 2021-12-03 PROCEDURE — 11056 PARNG/CUTG B9 HYPRKR LES 2-4: CPT | Performed by: PODIATRIST

## 2021-12-03 PROCEDURE — 11721 DEBRIDE NAIL 6 OR MORE: CPT | Performed by: PODIATRIST

## 2021-12-03 NOTE — PROGRESS NOTES
Sonny Estrada Work is here today for nail care. his PCP is Satya Lomeli MD last OV was 2021. Sonny Estrada Work : 1950 Sex: male  Age: 66 y.o. Patient was referred by Satya Lomeli MD    CC:    Follow-up painful elongated toenails 1 through 5 right and left    HPI:   Follow-up painful elongated toenails 1 through 5 right and left    No open skin lesion abrasions. Does have dry skin on bottom both feet. No calf pain. No additional pedal complaints. ROS:  Const: Denies constitutional symptoms  Musculo: Denies symptoms other than stated above  Skin: Denies symptoms other than stated above       Current Outpatient Medications:     diclofenac (VOLTAREN) 50 MG EC tablet, , Disp: , Rfl:     LORazepam (ATIVAN) 0.5 MG tablet, TAKE 1 TABLET BY MOUTH EVERY DAY, Disp: , Rfl:     levocetirizine (XYZAL) 5 MG tablet, Take by mouth, Disp: , Rfl:     pantoprazole (PROTONIX) 40 MG tablet, , Disp: , Rfl:     losartan (COZAAR) 50 MG tablet, , Disp: , Rfl:     atorvastatin (LIPITOR) 10 MG tablet, , Disp: , Rfl:   No Known Allergies    No past medical history on file. There were no vitals filed for this visit. Orthopedic history: TKA Dr. Cici Kirkpatrick left 2019/ 2019. Focused Lower Extremity Physical Exam:    Neurovascular examination:    Dorsalis Pedis palpable bilateral.  Posterior tibialis palpable bilateral.    Capillary Refill Time:  Immediate return  Hair growth:  Symmetrical and bilateral   Skin:  Not atrophic  Edema: No edema bilateral feet or ankles. Neurologic:  Light touch intact bilateral.      Musculoskeletal/ Orthopedic examination:    Equinis: Absent bilateral  Dorsiflexion, plantarflexion, inversion, eversion bilateral 5 out of 5 muscle strength  Wiggling toes  Negative [facility]    Dermatology examination:      Toenails 1-5 right and left thickened, elongated, dystrophic, mycotic with subungual debris. Webspaces 1-4 bilateral clean, dry and intact.    Hyperkeratotic tissue noted plantar first metatarsal head bilateral.    No open skin lesions or abrasions. Does have dry skin plantar feet. Assessment and Plan:  Taya Kwok was seen today for callouses and nail problem. Diagnoses and all orders for this visit:    Tinea unguium    Corns and callosities    Hereditary sensory neuropathy    Tinea pedis of both feet        Follow-up painful elongated toenails 1 through 5 right and left    Manual debridement with standard technique toenails 1 through 5 right and left in thickness and length. Patient tolerated procedure well. Paring of hyperkeratotic tissue with #15 blade plantar 1st metatarsal head bilateral and medial IPJ great toe bilateral.      Lotion on the bottom both feet daily. Avoid barefoot. I will follow-up in 2 months. Return in about 2 months (around 2/3/2022). Seen By:  Alessandra Donato DPM      Document was created using voice recognition software. Note was reviewed, however may contain grammatical errors.

## 2022-02-09 ENCOUNTER — OFFICE VISIT (OUTPATIENT)
Dept: FAMILY MEDICINE CLINIC | Age: 72
End: 2022-02-09
Payer: MEDICARE

## 2022-02-09 VITALS
OXYGEN SATURATION: 98 % | BODY MASS INDEX: 29.09 KG/M2 | WEIGHT: 234 LBS | HEART RATE: 64 BPM | TEMPERATURE: 96.2 F | DIASTOLIC BLOOD PRESSURE: 86 MMHG | HEIGHT: 75 IN | SYSTOLIC BLOOD PRESSURE: 140 MMHG
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
## 2022-02-09 DIAGNOSIS — S00.91XA ABRASION OF HEAD, INITIAL ENCOUNTER: ICD-10-CM

## 2022-02-09 PROCEDURE — 3017F COLORECTAL CA SCREEN DOC REV: CPT | Performed by: FAMILY MEDICINE

## 2022-02-09 PROCEDURE — 1123F ACP DISCUSS/DSCN MKR DOCD: CPT | Performed by: FAMILY MEDICINE

## 2022-02-09 PROCEDURE — 99214 OFFICE O/P EST MOD 30 MIN: CPT | Performed by: FAMILY MEDICINE

## 2022-02-09 PROCEDURE — 1036F TOBACCO NON-USER: CPT | Performed by: FAMILY MEDICINE

## 2022-02-09 PROCEDURE — G8417 CALC BMI ABV UP PARAM F/U: HCPCS | Performed by: FAMILY MEDICINE

## 2022-02-09 PROCEDURE — G8427 DOCREV CUR MEDS BY ELIG CLIN: HCPCS | Performed by: FAMILY MEDICINE

## 2022-02-09 PROCEDURE — G8484 FLU IMMUNIZE NO ADMIN: HCPCS | Performed by: FAMILY MEDICINE

## 2022-02-09 PROCEDURE — 4040F PNEUMOC VAC/ADMIN/RCVD: CPT | Performed by: FAMILY MEDICINE

## 2022-02-09 NOTE — PROGRESS NOTES
[facility]isc[facility] In    Violette Delgado Work presents to the office today for   Chief Complaint   Patient presents with   Barney Sing is here for fall and hit R side of head against wooden railing on steps  Slipped on ice on step about 12:30 PM  No vomiting  No loss of consciousness  No blood thinners  No difficulty driving or walking    Review of Systems     BP (!) 140/86   Pulse 64   Temp 96.2 °F (35.7 °C) (Temporal)   Ht 6' 3\" (1.905 m)   Wt 234 lb (106.1 kg)   SpO2 98%   BMI 29.25 kg/m²   Physical Exam  Constitutional:       Appearance: Normal appearance. HENT:      Head: Normocephalic and atraumatic. Comments: Slight abrasion, no laceration, mild ttp, no active bleeding  Eyes:      Extraocular Movements: Extraocular movements intact. Conjunctiva/sclera: Conjunctivae normal.   Cardiovascular:      Rate and Rhythm: Normal rate. Heart sounds: Normal heart sounds. Pulmonary:      Effort: Pulmonary effort is normal.      Breath sounds: Normal breath sounds. Skin:     General: Skin is warm. Neurological:      General: No focal deficit present. Mental Status: He is alert and oriented to person, place, and time. Cranial Nerves: Cranial nerves are intact. Sensory: Sensation is intact. Motor: Motor function is intact. Coordination: Coordination is intact. Gait: Gait is intact. Psychiatric:         Mood and Affect: Mood normal.         Behavior: Behavior normal.            Current Outpatient Medications:     diclofenac (VOLTAREN) 50 MG EC tablet, , Disp: , Rfl:     LORazepam (ATIVAN) 0.5 MG tablet, TAKE 1 TABLET BY MOUTH EVERY DAY, Disp: , Rfl:     levocetirizine (XYZAL) 5 MG tablet, Take by mouth, Disp: , Rfl:     pantoprazole (PROTONIX) 40 MG tablet, , Disp: , Rfl:     losartan (COZAAR) 50 MG tablet, , Disp: , Rfl:     atorvastatin (LIPITOR) 10 MG tablet, , Disp: , Rfl:      No past medical history on file.     Violette Delgado was seen today for fall and head injury.     Diagnoses and all orders for this visit:    Fall due to slipping on ice or snow, initial encounter    Abrasion of head, initial encounter       His exam is benign, no red flags on history  Reviewed signs and symptoms to go to ER  He is not on blood thinners  He did not lose consciousness  Spend the afternoon and evening with family members for monitoring  To ER if symptoms worsen or change    Bonita Garcia MD

## 2022-02-11 ENCOUNTER — PROCEDURE VISIT (OUTPATIENT)
Dept: PODIATRY | Age: 72
End: 2022-02-11
Payer: MEDICARE

## 2022-02-11 DIAGNOSIS — G60.8 HEREDITARY SENSORY NEUROPATHY: ICD-10-CM

## 2022-02-11 DIAGNOSIS — B35.3 TINEA PEDIS OF BOTH FEET: ICD-10-CM

## 2022-02-11 DIAGNOSIS — L84 CORNS AND CALLOSITIES: ICD-10-CM

## 2022-02-11 DIAGNOSIS — B35.1 TINEA UNGUIUM: Primary | ICD-10-CM

## 2022-02-11 PROCEDURE — 11056 PARNG/CUTG B9 HYPRKR LES 2-4: CPT | Performed by: PODIATRIST

## 2022-02-11 PROCEDURE — 11721 DEBRIDE NAIL 6 OR MORE: CPT | Performed by: PODIATRIST

## 2022-02-11 NOTE — PROGRESS NOTES
Andrea Villalta Work is here today for nail care. his PCP is Shawanda Busch MD last OV was  05/17/2021. CC:    Follow-up painful elongated toenails 1 through 5 right and left    HPI:   Follow-up painful elongated toenails 1 through 5 right and left  No open wounds. Dry skin. No additional pedal complaints. ROS:  Const: Denies constitutional symptoms  Musculo: Denies symptoms other than stated above  Skin: Denies symptoms other than stated above       Current Outpatient Medications:     diclofenac (VOLTAREN) 50 MG EC tablet, , Disp: , Rfl:     LORazepam (ATIVAN) 0.5 MG tablet, TAKE 1 TABLET BY MOUTH EVERY DAY, Disp: , Rfl:     levocetirizine (XYZAL) 5 MG tablet, Take by mouth, Disp: , Rfl:     pantoprazole (PROTONIX) 40 MG tablet, , Disp: , Rfl:     losartan (COZAAR) 50 MG tablet, , Disp: , Rfl:     atorvastatin (LIPITOR) 10 MG tablet, , Disp: , Rfl:   No Known Allergies    No past medical history on file. There were no vitals filed for this visit. Orthopedic history: TKA Dr. David De Jesus left July 2019/ October 2019. Focused Lower Extremity Physical Exam:    Neurovascular examination:    Dorsalis Pedis palpable bilateral.  Posterior tibialis palpable bilateral.    Capillary Refill Time:  Immediate return  Hair growth:  Symmetrical and bilateral   Skin:  Not atrophic  Edema: No edema bilateral feet or ankles. Neurologic:  Light touch intact bilateral.      Musculoskeletal/ Orthopedic examination:    Equinis: Absent bilateral  Dorsiflexion, plantarflexion, inversion, eversion bilateral 5 out of 5 muscle strength  Wiggling toes  [facility]    Dermatology examination:      Toenails 1-5 right and left thickened, elongated, dystrophic, mycotic with subungual debris. Webspaces 1-4 bilateral clean, dry and intact. Hyperkeratotic tissue noted plantar first metatarsal head bilateral.    No open wounds. Dry skin plantar feet.         Assessment and Plan:  Andrea Villalta was seen today for callouses and nail problem. Diagnoses and all orders for this visit:    Tinea unguium    Corns and callosities    Hereditary sensory neuropathy    Tinea pedis of both feet        Follow-up painful elongated toenails 1 through 5 right and left    Manual debridement with standard technique toenails 1 through 5 right and left in thickness and length. Patient tolerated procedure well. Paring of hyperkeratotic tissue with #15 blade plantar 1st metatarsal head bilateral and medial IPJ great toe bilateral.    Continue lotion on bottom feet daily. Avoid barefoot. Follow-up 2 months. Return in about 2 months (around 4/11/2022). Seen By:  Mine Nichols DPM      Document was created using voice recognition software. Note was reviewed, however may contain grammatical errors.

## 2022-04-15 ENCOUNTER — PROCEDURE VISIT (OUTPATIENT)
Dept: PODIATRY | Age: 72
End: 2022-04-15
Payer: MEDICARE

## 2022-04-15 DIAGNOSIS — G60.8 HEREDITARY SENSORY NEUROPATHY: ICD-10-CM

## 2022-04-15 DIAGNOSIS — L84 CORNS AND CALLOSITIES: ICD-10-CM

## 2022-04-15 DIAGNOSIS — B35.1 TINEA UNGUIUM: Primary | ICD-10-CM

## 2022-04-15 DIAGNOSIS — B35.3 TINEA PEDIS OF BOTH FEET: ICD-10-CM

## 2022-04-15 PROCEDURE — 11056 PARNG/CUTG B9 HYPRKR LES 2-4: CPT | Performed by: PODIATRIST

## 2022-04-15 PROCEDURE — 11721 DEBRIDE NAIL 6 OR MORE: CPT | Performed by: PODIATRIST

## 2022-04-15 NOTE — PROGRESS NOTES
Julio C Gaitan Work is here today for nail care. his PCP is Lashell Aden MD last OV was 05/17/2021. CC:    Follow-up painful elongated toenails 1 through 5 right and left    HPI:   Follow-up painful elongated toenails 1 through 5 right and left  Pain-free. No additional pedal complaints. ROS:  Const: Denies constitutional symptoms  Musculo: Denies symptoms other than stated above  Skin: Denies symptoms other than stated above       Current Outpatient Medications:     diclofenac (VOLTAREN) 50 MG EC tablet, , Disp: , Rfl:     LORazepam (ATIVAN) 0.5 MG tablet, TAKE 1 TABLET BY MOUTH EVERY DAY, Disp: , Rfl:     levocetirizine (XYZAL) 5 MG tablet, Take by mouth, Disp: , Rfl:     pantoprazole (PROTONIX) 40 MG tablet, , Disp: , Rfl:     losartan (COZAAR) 50 MG tablet, , Disp: , Rfl:     atorvastatin (LIPITOR) 10 MG tablet, , Disp: , Rfl:   No Known Allergies    No past medical history on file. There were no vitals filed for this visit. Orthopedic history: TKA Dr. Abbe Woody left July 2019/ October 2019. Focused Lower Extremity Physical Exam:    Neurovascular examination:    Dorsalis Pedis palpable bilateral.  Posterior tibialis palpable bilateral.    Capillary Refill Time:  Immediate return  Hair growth:  Symmetrical and bilateral   Skin:  Not atrophic  Edema: No edema bilateral feet or ankles. Neurologic:  Light touch intact bilateral.      Musculoskeletal/ Orthopedic examination:    Equinis: Absent bilateral  Dorsiflexion, plantarflexion, inversion, eversion bilateral 5 out of 5 muscle strength  Wiggling toes  Negative Homans  No pain foot or ankle    Dermatology examination:      Toenails 1-5 right and left thickened, elongated, dystrophic, mycotic with subungual debris. Webspaces 1-4 bilateral clean, dry and intact. Hyperkeratotic tissue plantar first metatarsal bilateral.  No erythema. No open wounds. Assessment and Plan:  Julio C Gaitan was seen today for callouses and nail problem.     Diagnoses and all orders for this visit:    Tinea unguium    Corns and callosities    Hereditary sensory neuropathy    Tinea pedis of both feet        Follow-up painful elongated toenails 1 through 5 right and left    Manual debridement with standard technique toenails 1 through 5 right and left in thickness and length. Patient tolerated procedure well. Paring of hyperkeratotic tissue with #15 blade plantar 1st metatarsal head bilateral and medial IPJ great toe bilateral.    Continue lotion on the bottom both feet. Avoid barefoot. I will follow-up in office 2 months. Return in about 2 months (around 6/15/2022). Seen By:  Muriel Venegas DPM      Document was created using voice recognition software. Note was reviewed, however may contain grammatical errors.

## 2022-06-17 ENCOUNTER — PROCEDURE VISIT (OUTPATIENT)
Dept: PODIATRY | Age: 72
End: 2022-06-17
Payer: MEDICARE

## 2022-06-17 VITALS — WEIGHT: 234 LBS | BODY MASS INDEX: 29.09 KG/M2 | HEIGHT: 75 IN

## 2022-06-17 DIAGNOSIS — B35.3 TINEA PEDIS OF BOTH FEET: ICD-10-CM

## 2022-06-17 DIAGNOSIS — G60.8 HEREDITARY SENSORY NEUROPATHY: ICD-10-CM

## 2022-06-17 DIAGNOSIS — B35.1 TINEA UNGUIUM: Primary | ICD-10-CM

## 2022-06-17 DIAGNOSIS — L84 CORNS AND CALLOSITIES: ICD-10-CM

## 2022-06-17 PROCEDURE — 11056 PARNG/CUTG B9 HYPRKR LES 2-4: CPT | Performed by: PODIATRIST

## 2022-06-17 PROCEDURE — 11721 DEBRIDE NAIL 6 OR MORE: CPT | Performed by: PODIATRIST

## 2022-06-17 NOTE — PROGRESS NOTES
Cortes Santillan Work is here today for nail care. his PCP is Sid Wyatt MD last OV was 05/17/2022. CC:    Follow-up painful elongated toenails 1 through 5 right and left    HPI:   Follow-up painful elongated toenails 1 through 5 right and left  No recent injury. No open wounds. No additional pedal complaints. ROS:  Const: Denies constitutional symptoms  Musculo: Denies symptoms other than stated above  Skin: Denies symptoms other than stated above       Current Outpatient Medications:     diclofenac (VOLTAREN) 50 MG EC tablet, , Disp: , Rfl:     LORazepam (ATIVAN) 0.5 MG tablet, TAKE 1 TABLET BY MOUTH EVERY DAY, Disp: , Rfl:     levocetirizine (XYZAL) 5 MG tablet, Take by mouth, Disp: , Rfl:     pantoprazole (PROTONIX) 40 MG tablet, , Disp: , Rfl:     losartan (COZAAR) 50 MG tablet, , Disp: , Rfl:     atorvastatin (LIPITOR) 10 MG tablet, , Disp: , Rfl:   No Known Allergies    No past medical history on file. Vitals:    06/17/22 0844   Weight: 234 lb (106.1 kg)   Height: 6' 3\" (1.905 m)       Orthopedic history: TKA Dr. Cristiano Darling left July 2019/ October 2019. Focused Lower Extremity Physical Exam:    Neurovascular examination:    Dorsalis Pedis palpable bilateral.  Posterior tibialis palpable bilateral.    Capillary Refill Time:  Immediate return  Hair growth:  Symmetrical and bilateral   Skin:  Not atrophic  Edema: No edema bilateral feet or ankles. Neurologic:  Light touch intact bilateral.      Musculoskeletal/ Orthopedic examination:    Equinis: Absent bilateral  Dorsiflexion, plantarflexion, inversion, eversion bilateral 5 out of 5 muscle strength  Wiggling toes  Negative Homans  No pain foot or ankle    Dermatology examination:      Toenails 1-5 right and left thickened, elongated, dystrophic, mycotic with subungual debris. Webspaces 1-4 bilateral clean, dry and intact. Hyperkeratotic tissue plantar first metatarsal head bilateral.  No open wounds.       Assessment and Plan:  Cortes Santillan was seen today for callouses and nail problem. Diagnoses and all orders for this visit:    Tinea unguium    Hereditary sensory neuropathy    Corns and callosities    Tinea pedis of both feet        Follow-up painful elongated toenails 1 through 5 right and left    Manual debridement with standard technique toenails 1 through 5 right and left in thickness and length. Patient tolerated procedure well. Paring of hyperkeratotic tissue with #15 blade plantar 1st metatarsal head bilateral and medial IPJ great toe bilateral.  Avoid barefoot. Continue lotion daily. Follow-up in office 2 months. Return in about 2 months (around 8/17/2022). Seen By:  Jose De Jesus Goncalves DPM      Document was created using voice recognition software. Note was reviewed, however may contain grammatical errors.

## 2022-08-19 ENCOUNTER — PROCEDURE VISIT (OUTPATIENT)
Dept: PODIATRY | Age: 72
End: 2022-08-19
Payer: MEDICARE

## 2022-08-19 VITALS — BODY MASS INDEX: 29.09 KG/M2 | HEIGHT: 75 IN | WEIGHT: 234 LBS

## 2022-08-19 DIAGNOSIS — L84 CORNS AND CALLOSITIES: ICD-10-CM

## 2022-08-19 DIAGNOSIS — B35.1 TINEA UNGUIUM: Primary | ICD-10-CM

## 2022-08-19 DIAGNOSIS — B35.3 TINEA PEDIS OF BOTH FEET: ICD-10-CM

## 2022-08-19 DIAGNOSIS — G60.8 HEREDITARY SENSORY NEUROPATHY: ICD-10-CM

## 2022-08-19 PROCEDURE — 11721 DEBRIDE NAIL 6 OR MORE: CPT | Performed by: PODIATRIST

## 2022-08-19 PROCEDURE — 11056 PARNG/CUTG B9 HYPRKR LES 2-4: CPT | Performed by: PODIATRIST

## 2022-08-19 NOTE — PROGRESS NOTES
Wood Talavera Work is here today for nail care. his PCP is Jose Bernal MD last OV was 05/17/2022. CC:    Follow-up painful elongated toenails 1 through 5 right and left    HPI:   Follow-up painful elongated toenails 1 through 5 right and left  No open wounds no recent injury. No calf pain today. ROS:  Const: Denies constitutional symptoms  Musculo: Denies symptoms other than stated above  Skin: Denies symptoms other than stated above       Current Outpatient Medications:     diclofenac (VOLTAREN) 50 MG EC tablet, , Disp: , Rfl:     LORazepam (ATIVAN) 0.5 MG tablet, TAKE 1 TABLET BY MOUTH EVERY DAY, Disp: , Rfl:     levocetirizine (XYZAL) 5 MG tablet, Take by mouth, Disp: , Rfl:     pantoprazole (PROTONIX) 40 MG tablet, , Disp: , Rfl:     losartan (COZAAR) 50 MG tablet, , Disp: , Rfl:     atorvastatin (LIPITOR) 10 MG tablet, , Disp: , Rfl:   No Known Allergies    No past medical history on file. Vitals:    08/19/22 0826   Weight: 234 lb (106.1 kg)   Height: 6' 3\" (1.905 m)       Orthopedic history: TKA Dr. Lion Contreras left July 2019/ October 2019. Focused Lower Extremity Physical Exam:    Neurovascular examination:    Dorsalis Pedis palpable bilateral.  Posterior tibialis palpable bilateral.    Capillary Refill Time:  Immediate return  Hair growth:  Symmetrical and bilateral   Skin:  Not atrophic  Edema: Mild edema bilateral feet or ankles. Neurologic:  Light touch diminished bilateral.      Musculoskeletal/ Orthopedic examination:    Equinis: Absent bilateral  Dorsiflexion, plantarflexion, inversion, eversion bilateral 5 out of 5 muscle strength  Wiggling toes  Negative Homans. Negative Bro. Dermatology examination:      Toenails 1-5 right and left thickened, elongated, dystrophic, mycotic with subungual debris. Webspaces 1-4 bilateral clean, dry and intact. Hyperkeratotic tissue first metatarsal bilateral.  No open wounds.       Assessment and Plan:  Wood Talavera was seen today for callouses and nail problem. Diagnoses and all orders for this visit:    Tinea unguium    Corns and callosities    Hereditary sensory neuropathy    Tinea pedis of both feet        Follow-up foot ankle exam  Manual debridement with standard technique toenails 1 through 5 right and left in thickness and length. Patient tolerated procedure well. Paring hyperkeratotic tissue first metatarsal head bilateral and medial IPJ great toe bilateral with #15 blade. Lotion daily. Avoid barefoot  Follow-up 2 months      Return in about 2 months (around 10/19/2022). Seen By:  Jose Santamaria DPM      Document was created using voice recognition software. Note was reviewed, however may contain grammatical errors.

## 2022-09-30 ENCOUNTER — OFFICE VISIT (OUTPATIENT)
Dept: FAMILY MEDICINE CLINIC | Age: 72
End: 2022-09-30
Payer: MEDICARE

## 2022-09-30 VITALS
SYSTOLIC BLOOD PRESSURE: 140 MMHG | DIASTOLIC BLOOD PRESSURE: 80 MMHG | OXYGEN SATURATION: 97 % | HEART RATE: 68 BPM | TEMPERATURE: 98.1 F

## 2022-09-30 DIAGNOSIS — S90.852A FOREIGN BODY IN LEFT FOOT, INITIAL ENCOUNTER: Primary | ICD-10-CM

## 2022-09-30 PROCEDURE — 99213 OFFICE O/P EST LOW 20 MIN: CPT | Performed by: FAMILY MEDICINE

## 2022-09-30 PROCEDURE — 1123F ACP DISCUSS/DSCN MKR DOCD: CPT | Performed by: FAMILY MEDICINE

## 2022-09-30 ASSESSMENT — ENCOUNTER SYMPTOMS
FACIAL SWELLING: 0
COUGH: 0
COLOR CHANGE: 0
VOMITING: 0
APNEA: 0
SHORTNESS OF BREATH: 0
RHINORRHEA: 0
CONSTIPATION: 0
SINUS PRESSURE: 0
BLOOD IN STOOL: 0
SINUS PAIN: 0
PHOTOPHOBIA: 0
DIARRHEA: 0
ABDOMINAL DISTENTION: 0
CHEST TIGHTNESS: 0

## 2022-09-30 NOTE — PROGRESS NOTES
Katelynn Camargo is a 67 y.o. male accompanied by self  CC:   Chief Complaint   Patient presents with    Foreign Body     Left foot        Patient states that for the last week he has felt like he has had something stuck in his left foot. It is located just distal to the arch of the foot over the pad of the third toe. States it does not hurt unless he puts all of his pressure on it  At that point time it is only a 4 out of 10  He has no associated numbness or tingling  He has no associated fevers  He has no associated loss of function. He does state that he oftentimes walks barefoot in his own garage. He does not remember a moment where he definitely felt like something went into his foot. Patient's past medical, surgical, social and/or family history reviewed, updated in chart, and are non-contributory (unless otherwise stated). Medications and allergies also reviewed and updated in chart. BP (!) 140/80   Pulse 68   Temp 98.1 °F (36.7 °C)   SpO2 97%     Review of Systems   Constitutional:  Negative for chills, fatigue and fever. HENT:  Negative for congestion, ear pain, facial swelling, rhinorrhea, sinus pressure and sinus pain. Eyes:  Negative for photophobia and visual disturbance. Respiratory:  Negative for apnea, cough, chest tightness and shortness of breath. Cardiovascular:  Negative for chest pain and palpitations. Gastrointestinal:  Negative for abdominal distention, blood in stool, constipation, diarrhea and vomiting. Endocrine: Negative for cold intolerance, polydipsia, polyphagia and polyuria. Genitourinary:  Negative for decreased urine volume, frequency and urgency. Musculoskeletal:  Negative for arthralgias and gait problem. Skin:  Negative for color change. Allergic/Immunologic: Negative for environmental allergies and food allergies. Neurological:  Negative for dizziness, light-headedness and headaches. Hematological:  Negative for adenopathy. Psychiatric/Behavioral:  Negative for agitation and confusion. Physical Exam  Constitutional:       Appearance: Normal appearance. HENT:      Head: Normocephalic and atraumatic. Nose: Nose normal.   Eyes:      Extraocular Movements: Extraocular movements intact. Pupils: Pupils are equal, round, and reactive to light. Cardiovascular:      Rate and Rhythm: Normal rate and regular rhythm. Heart sounds: No murmur heard. No friction rub. No gallop. Pulmonary:      Effort: Pulmonary effort is normal.      Breath sounds: Normal breath sounds. Chest:      Chest wall: No tenderness. Abdominal:      General: Abdomen is flat. Bowel sounds are normal. There is no distension. Palpations: Abdomen is soft. Tenderness: There is no abdominal tenderness. Musculoskeletal:         General: Normal range of motion. Cervical back: Normal range of motion. Feet:    Feet:      Comments: After further examination the callus appearing formation did produce pus. With significant pressure there is a foreign body present. Skin:     General: Skin is warm and dry. Comments: The patient's feet are both cyanotic appearance. There is good capillary refill. There are good pulses bilaterally. Neurological:      General: No focal deficit present. Mental Status: He is alert and oriented to person, place, and time. Psychiatric:         Mood and Affect: Mood normal.       Assessment:  Tonja Souza was seen today for foreign body. Diagnoses and all orders for this visit:    Foreign body in left foot, initial encounter  Comments:  - Foreign body removal  - Small piece of glass  - The abscess to formation was expressed. Using a scalpel the abscess trouble formation was extended minimally, then using a pair of tweezers the glass shard was captured. The wound was further thoroughly examined, no further foreign bodies were found.       Follow Up:  Return for Podiatric follow-up with  Jovan Prieto As above. Call or go to ED immediately if symptoms worsen or persist.  Return for Podiatric follow-up with Dr. Jovan Asencio. , or sooner if necessary. Educational materials and/or home exercises printed for patient's review and were included in patient instructions on his/her After Visit Summary and given to patient at the end ofvisit. Counseled regarding above diagnosis, including possible risks and complications,  especially if left uncontrolled. Counseledregarding the possible side effects, risks, benefits and alternatives to treatment; patient and/or guardian verbalizes understanding, agrees, feels comfortable with and wishes to proceed with above treatment plan. patient to call with any new medication issues, and read all Rx info from pharmacy to assure aware of all possible risks and side effects of medication before taking. Patient and/or guardian verbalizes understanding and agrees with above counseling,assessment and plan. All questions answered.

## 2022-10-21 ENCOUNTER — PROCEDURE VISIT (OUTPATIENT)
Dept: PODIATRY | Age: 72
End: 2022-10-21
Payer: MEDICARE

## 2022-10-21 VITALS — HEIGHT: 74 IN | BODY MASS INDEX: 30.03 KG/M2 | WEIGHT: 234 LBS | TEMPERATURE: 98.3 F

## 2022-10-21 DIAGNOSIS — L84 CORNS AND CALLOSITIES: ICD-10-CM

## 2022-10-21 DIAGNOSIS — B35.1 TINEA UNGUIUM: Primary | ICD-10-CM

## 2022-10-21 DIAGNOSIS — B35.3 TINEA PEDIS OF BOTH FEET: ICD-10-CM

## 2022-10-21 DIAGNOSIS — G60.8 HEREDITARY SENSORY NEUROPATHY: ICD-10-CM

## 2022-10-21 PROCEDURE — 11721 DEBRIDE NAIL 6 OR MORE: CPT | Performed by: PODIATRIST

## 2022-10-21 PROCEDURE — 11056 PARNG/CUTG B9 HYPRKR LES 2-4: CPT | Performed by: PODIATRIST

## 2022-10-21 NOTE — PROGRESS NOTES
Bina Azevedo [employer] is here today for nail care. his PCP is Kang Llanos MD last OV was 05/17/2022. CC:    Follow-up painful elongated toenails 1 through 5 right and left    HPI:   Follow-up painful elongated toenails 1 through 5 right and left  No pain foot or ankle. No open skin lesions or abrasions. No recent injury. No additional pedal complaints. ROS:  Const: Denies constitutional symptoms  Musculo: Denies symptoms other than stated above  Skin: Denies symptoms other than stated above       Current Outpatient Medications:     diclofenac (VOLTAREN) 50 MG EC tablet, , Disp: , Rfl:     LORazepam (ATIVAN) 0.5 MG tablet, TAKE 1 TABLET BY MOUTH EVERY DAY, Disp: , Rfl:     levocetirizine (XYZAL) 5 MG tablet, Take by mouth, Disp: , Rfl:     pantoprazole (PROTONIX) 40 MG tablet, , Disp: , Rfl:     losartan (COZAAR) 50 MG tablet, , Disp: , Rfl:     atorvastatin (LIPITOR) 10 MG tablet, , Disp: , Rfl:   No Known Allergies    No past medical history on file. Vitals:    10/21/22 0837   Temp: 98.3 °F (36.8 °C)   TempSrc: Temporal   Weight: 234 lb (106.1 kg)   Height: 6' 2\" (1.88 m)       Orthopedic history: TKA Dr. Viki Elizondo left July 2019/ October 2019. Focused Lower Extremity Physical Exam:    Neurovascular examination:    Dorsalis Pedis palpable bilateral.  Posterior tibialis palpable bilateral.    Capillary Refill Time:  Immediate return  Hair growth:  Symmetrical and bilateral   Skin:  Not atrophic  Edema: Mild edema bilateral feet or ankles. Neurologic:  Light touch diminished bilateral.      Musculoskeletal/ Orthopedic examination:    Equinis: Absent bilateral  Dorsiflexion, plantarflexion, inversion, eversion bilateral 5 out of 5 muscle strength  Wiggling toes  Negative Bro. Negative Homans. Dermatology examination:      Toenails 1-5 right and left thickened, elongated, dystrophic, mycotic with subungual debris. Webspaces 1-4 bilateral clean, dry and intact. No erythema.   No open wounds. Hyperkeratotic tissue plantar first metatarsal head bilateral, fifth metatarsal head bilateral and IPJ great toe bilateral.      Assessment and Plan:  Yary Ortiz was seen today for toe pain. Diagnoses and all orders for this visit:    Tinea unguium    Corns and callosities    Hereditary sensory neuropathy    Tinea pedis of both feet        Follow-up foot ankle exam  Manual debridement with standard technique toenails 1 through 5 right and left in thickness and length. Patient tolerated procedure well. I did pare hyperkeratotic tissue plantar first metatarsal head and fifth metatarsal head bilateral with #15 blade. Did also pare hyperkeratotic tissue IPJ great toe bilateral.  Tolerated well. Ammonium lactate 12% twice daily. Follow-up 2 months. Return in about 2 months (around 12/21/2022). Seen By:  Gillian Darnell DPM      Document was created using voice recognition software. Note was reviewed, however may contain grammatical errors.

## 2022-12-30 ENCOUNTER — PROCEDURE VISIT (OUTPATIENT)
Dept: PODIATRY | Age: 72
End: 2022-12-30
Payer: MEDICARE

## 2022-12-30 VITALS — WEIGHT: 234 LBS | HEIGHT: 74 IN | BODY MASS INDEX: 30.03 KG/M2

## 2022-12-30 DIAGNOSIS — L84 CORNS AND CALLOSITIES: ICD-10-CM

## 2022-12-30 DIAGNOSIS — G60.8 HEREDITARY SENSORY NEUROPATHY: ICD-10-CM

## 2022-12-30 DIAGNOSIS — B35.1 TINEA UNGUIUM: Primary | ICD-10-CM

## 2022-12-30 DIAGNOSIS — B35.3 TINEA PEDIS OF BOTH FEET: ICD-10-CM

## 2022-12-30 PROCEDURE — 11056 PARNG/CUTG B9 HYPRKR LES 2-4: CPT | Performed by: PODIATRIST

## 2022-12-30 PROCEDURE — 11721 DEBRIDE NAIL 6 OR MORE: CPT | Performed by: PODIATRIST

## 2022-12-30 NOTE — PROGRESS NOTES
Haris Yancey Work is here today for nail care. his PCP is Dwaine Daniel MD last OV was 11/21/2022. CC:    Follow-up painful elongated toenails 1 through 5 right and left    HPI:   Follow-up painful elongated toenails 1 through 5 right and left  No pain foot or ankle. No recent injury. No additional pedal complaints. ROS:  Const: Denies constitutional symptoms  Musculo: Denies symptoms other than stated above  Skin: Denies symptoms other than stated above       Current Outpatient Medications:     diclofenac (VOLTAREN) 50 MG EC tablet, , Disp: , Rfl:     LORazepam (ATIVAN) 0.5 MG tablet, TAKE 1 TABLET BY MOUTH EVERY DAY, Disp: , Rfl:     levocetirizine (XYZAL) 5 MG tablet, Take by mouth, Disp: , Rfl:     pantoprazole (PROTONIX) 40 MG tablet, , Disp: , Rfl:     losartan (COZAAR) 50 MG tablet, , Disp: , Rfl:     atorvastatin (LIPITOR) 10 MG tablet, , Disp: , Rfl:   No Known Allergies    No past medical history on file. Vitals:    12/30/22 0849   Weight: 234 lb (106.1 kg)   Height: 6' 2\" (1.88 m)       Orthopedic history: TKA Dr. Yoselin Fisher left July 2019/ October 2019. Focused Lower Extremity Physical Exam:    Neurovascular examination:    Dorsalis Pedis palpable bilateral.  Posterior tibialis palpable bilateral.    Capillary Refill Time:  Immediate return  Hair growth:  Symmetrical and bilateral   Skin:  Not atrophic  Edema: Mild edema bilateral feet or ankles. Neurologic:  Light touch diminished bilateral.      Musculoskeletal/ Orthopedic examination:    Equinis: Absent bilateral  Dorsiflexion, plantarflexion, inversion, eversion bilateral 5 out of 5 muscle strength  Wiggling toes  Negative Homans. No pain foot or ankle. Dermatology examination:      Toenails 1-5 right and left thickened, elongated, dystrophic, mycotic with subungual debris. Webspaces 1-4 clean dry intact bilateral.  No open wounds. No erythema.   Hyperkeratotic tissue noted plantar first metatarsal head bilateral.  There is additional hyperkeratotic tissue IPJ great toe bilateral.      Assessment and Plan:  Elena Saab was seen today for callouses and nail problem. Diagnoses and all orders for this visit:    Tinea unguium    Corns and callosities    Hereditary sensory neuropathy    Tinea pedis of both feet        Follow-up foot ankle exam  Manual debridement with standard technique toenails 1 through 5 right and left in thickness and length. Patient tolerated procedure well. I did pare hyperkeratotic tissue plantar first metatarsal head bilateral and IPJ great toe bilateral.  Tolerated well. Avoid barefoot. Follow-up 2 months. Next    Return in about 2 months (around 2/28/2023). Seen By:  Marva Merritt DPM      Document was created using voice recognition software. Note was reviewed, however may contain grammatical errors.

## 2023-05-25 ENCOUNTER — PROCEDURE VISIT (OUTPATIENT)
Dept: PODIATRY | Age: 73
End: 2023-05-25
Payer: MEDICARE

## 2023-05-25 DIAGNOSIS — B35.3 TINEA PEDIS OF BOTH FEET: ICD-10-CM

## 2023-05-25 DIAGNOSIS — B35.1 TINEA UNGUIUM: Primary | ICD-10-CM

## 2023-05-25 DIAGNOSIS — G60.8 HEREDITARY SENSORY NEUROPATHY: ICD-10-CM

## 2023-05-25 DIAGNOSIS — L84 CORNS AND CALLOSITIES: ICD-10-CM

## 2023-05-25 PROCEDURE — 11056 PARNG/CUTG B9 HYPRKR LES 2-4: CPT | Performed by: PODIATRIST

## 2023-05-25 PROCEDURE — 11721 DEBRIDE NAIL 6 OR MORE: CPT | Performed by: PODIATRIST

## 2023-05-25 NOTE — PROGRESS NOTES
Yogesh Chery Work is here today for nail care. his PCP is Robby Brody MD last OV was 05/18/2023     CC: Follow-up painful elongated toenails 1 through 5 right and left    HPI:   Follow-up elongated toenails 1 through 5 right left. No calf pain. No recent injury. No additional pedal complaints today. ROS:  Const: Denies constitutional symptoms  Musculo: Denies symptoms other than stated above  Skin: Denies symptoms other than stated above       Current Outpatient Medications:     diclofenac (VOLTAREN) 50 MG EC tablet, , Disp: , Rfl:     LORazepam (ATIVAN) 0.5 MG tablet, TAKE 1 TABLET BY MOUTH EVERY DAY, Disp: , Rfl:     levocetirizine (XYZAL) 5 MG tablet, Take by mouth, Disp: , Rfl:     pantoprazole (PROTONIX) 40 MG tablet, , Disp: , Rfl:     losartan (COZAAR) 50 MG tablet, , Disp: , Rfl:     atorvastatin (LIPITOR) 10 MG tablet, , Disp: , Rfl:   No Known Allergies    No past medical history on file. There were no vitals filed for this visit. Orthopedic history: TKA Dr. Dora Roca left July 2019/ October 2019. Focused Lower Extremity Physical Exam:    Neurovascular examination:    Dorsalis Pedis palpable bilateral.  Posterior tibialis palpable bilateral.    Capillary Refill Time:  Immediate return  Hair growth:  Symmetrical and bilateral   Skin:  Not atrophic  Edema: Mild edema bilateral feet or ankles. Neurologic:  Light touch diminished bilateral.      Musculoskeletal/ Orthopedic examination:    Equinis: Absent bilateral  Dorsiflexion, plantarflexion, inversion, eversion bilateral 5 out of 5 muscle strength  Wiggling toes  Negative Homans. No pain foot or ankle bilateral    Dermatology examination:      Toenails 1-5 right and left thickened, elongated, dystrophic, mycotic with subungual debris. Webspaces 1-4 clean dry intact bilateral.  No open wounds. No erythema. [place] distribution plantar feet. No open wounds.   Hyperkeratotic tissue IPJ and plantar first metatarsal

## 2023-07-27 ENCOUNTER — PROCEDURE VISIT (OUTPATIENT)
Dept: PODIATRY | Age: 73
End: 2023-07-27
Payer: MEDICARE

## 2023-07-27 VITALS — HEIGHT: 74 IN | BODY MASS INDEX: 30.03 KG/M2 | WEIGHT: 234 LBS

## 2023-07-27 DIAGNOSIS — B35.1 TINEA UNGUIUM: Primary | ICD-10-CM

## 2023-07-27 DIAGNOSIS — G60.8 HEREDITARY SENSORY NEUROPATHY: ICD-10-CM

## 2023-07-27 DIAGNOSIS — L84 CORNS AND CALLOSITIES: ICD-10-CM

## 2023-07-27 PROCEDURE — 11721 DEBRIDE NAIL 6 OR MORE: CPT | Performed by: PODIATRIST

## 2023-07-27 PROCEDURE — 11056 PARNG/CUTG B9 HYPRKR LES 2-4: CPT | Performed by: PODIATRIST

## 2023-07-27 NOTE — PROGRESS NOTES
Khloe Camargo is here today for nail care. his PCP is Sandra Corona MD last OV was 05/25/2023. CC:    Follow-up painful elongated toenails 1 through 5 right and left    HPI:   Follow-up elongated toenails 1 through 5 right left. Denies any redness of his feet. Denies any foot or ankle pain. No additional pedal complaints today. ROS:  Const: Denies constitutional symptoms  Musculo: Denies symptoms other than stated above  Skin: Denies symptoms other than stated above       Current Outpatient Medications:     diclofenac (VOLTAREN) 50 MG EC tablet, , Disp: , Rfl:     LORazepam (ATIVAN) 0.5 MG tablet, TAKE 1 TABLET BY MOUTH EVERY DAY, Disp: , Rfl:     levocetirizine (XYZAL) 5 MG tablet, Take by mouth, Disp: , Rfl:     pantoprazole (PROTONIX) 40 MG tablet, , Disp: , Rfl:     losartan (COZAAR) 50 MG tablet, , Disp: , Rfl:     atorvastatin (LIPITOR) 10 MG tablet, , Disp: , Rfl:   No Known Allergies    No past medical history on file. Vitals:    07/27/23 0906   Weight: 234 lb (106.1 kg)   Height: 6' 2\" (1.88 m)         Orthopedic history: TKA Dr. Samanta Garcia left July 2019/ October 2019. Focused Lower Extremity Physical Exam:    Neurovascular examination:    Dorsalis Pedis palpable bilateral.  Posterior tibialis palpable bilateral.    Capillary Refill Time:  Immediate return  Hair growth:  Symmetrical and bilateral   Skin:  Not atrophic  Edema: Mild edema bilateral feet or ankles. Neurologic:  Light touch diminished bilateral.      Musculoskeletal/ Orthopedic examination:    Equinis: Absent bilateral  Dorsiflexion, plantarflexion, inversion, eversion bilateral 5 out of 5 muscle strength  Wiggling toes  Negative Homans. No pain foot or ankle bilateral    Dermatology examination:      Toenails 1-5 right and left thickened, elongated, dystrophic, mycotic with subungual debris. Webspaces 1-4 clean dry intact bilateral.  No open wounds. No erythema.   Hyperkeratotic tissue IPJ great toe bilateral plantar

## 2023-09-28 ENCOUNTER — PROCEDURE VISIT (OUTPATIENT)
Dept: PODIATRY | Age: 73
End: 2023-09-28
Payer: MEDICARE

## 2023-09-28 DIAGNOSIS — L84 CORNS AND CALLOSITIES: ICD-10-CM

## 2023-09-28 DIAGNOSIS — B35.3 TINEA PEDIS OF BOTH FEET: ICD-10-CM

## 2023-09-28 DIAGNOSIS — B35.1 TINEA UNGUIUM: Primary | ICD-10-CM

## 2023-09-28 DIAGNOSIS — G60.8 HEREDITARY SENSORY NEUROPATHY: ICD-10-CM

## 2023-09-28 PROCEDURE — 11721 DEBRIDE NAIL 6 OR MORE: CPT | Performed by: PODIATRIST

## 2023-09-28 PROCEDURE — 11056 PARNG/CUTG B9 HYPRKR LES 2-4: CPT | Performed by: PODIATRIST

## 2023-09-28 RX ORDER — CLOTRIMAZOLE AND BETAMETHASONE DIPROPIONATE 10; .64 MG/G; MG/G
CREAM TOPICAL
Qty: 45 G | Refills: 1 | Status: SHIPPED | OUTPATIENT
Start: 2023-09-28

## 2023-09-28 RX ORDER — PRENATAL VIT 91/IRON/FOLIC/DHA 28-975-200
COMBINATION PACKAGE (EA) ORAL
Qty: 42 G | Refills: 1 | Status: SHIPPED | OUTPATIENT
Start: 2023-09-28

## 2023-09-28 NOTE — PROGRESS NOTES
Jovita Hdz Work is here today for nail care. his PCP is Brenda Marie MD last OV was 05/25/2023. CC:    Follow-up painful elongated toenails 1 through 5 right and left    HPI:   Follow-up elongated toenails both feet. No open wounds. No recent injury or trauma. Denies foot or ankle pain today. Does have occasional itching on the bottom both feet. No open wounds. ROS:  Const: Denies constitutional symptoms  Musculo: Denies symptoms other than stated above  Skin: Denies symptoms other than stated above       Current Outpatient Medications:     terbinafine (LAMISIL) 1 % cream, Apply affected area topically 2 times daily. , Disp: 42 g, Rfl: 1    clotrimazole-betamethasone (LOTRISONE) 1-0.05 % cream, Apply topically 2 times daily. , Disp: 45 g, Rfl: 1    diclofenac (VOLTAREN) 50 MG EC tablet, , Disp: , Rfl:     LORazepam (ATIVAN) 0.5 MG tablet, TAKE 1 TABLET BY MOUTH EVERY DAY, Disp: , Rfl:     levocetirizine (XYZAL) 5 MG tablet, Take by mouth, Disp: , Rfl:     pantoprazole (PROTONIX) 40 MG tablet, , Disp: , Rfl:     losartan (COZAAR) 50 MG tablet, , Disp: , Rfl:     atorvastatin (LIPITOR) 10 MG tablet, , Disp: , Rfl:   No Known Allergies    No past medical history on file. There were no vitals filed for this visit. Orthopedic history: TKA Dr. Anya Wellington left July 2019/ October 2019. Focused Lower Extremity Physical Exam:    Neurovascular examination:    Dorsalis Pedis palpable bilateral.  Posterior tibialis palpable bilateral.    Capillary Refill Time:  Immediate return  Hair growth:  Symmetrical and bilateral   Skin:  Not atrophic  Edema: Mild edema bilateral feet or ankles. Neurologic:  Light touch diminished bilateral.      Musculoskeletal/ Orthopedic examination:    Equinis: Absent bilateral  Dorsiflexion, plantarflexion, inversion, eversion bilateral 5 out of 5 muscle strength  Wiggling toes pain-free. Negative Homans.     Dermatology examination:      Toenails 1-5 right and left thickened,

## 2023-11-30 ENCOUNTER — PROCEDURE VISIT (OUTPATIENT)
Dept: PODIATRY | Age: 73
End: 2023-11-30
Payer: MEDICARE

## 2023-11-30 VITALS — BODY MASS INDEX: 29.09 KG/M2 | WEIGHT: 234 LBS | HEIGHT: 75 IN

## 2023-11-30 DIAGNOSIS — B35.1 TINEA UNGUIUM: Primary | ICD-10-CM

## 2023-11-30 DIAGNOSIS — L84 CORNS AND CALLOSITIES: ICD-10-CM

## 2023-11-30 DIAGNOSIS — G60.8 HEREDITARY SENSORY NEUROPATHY: ICD-10-CM

## 2023-11-30 PROCEDURE — 11721 DEBRIDE NAIL 6 OR MORE: CPT | Performed by: PODIATRIST

## 2023-11-30 PROCEDURE — 11056 PARNG/CUTG B9 HYPRKR LES 2-4: CPT | Performed by: PODIATRIST

## 2023-11-30 NOTE — PROGRESS NOTES
Patient in today for nail care. Patient does not have any complaints of pain at this time. Patient's PCP is Domenic Shannon MD date of last ov 05/25/2023. Nanette Oliver LPN       CC:    Follow-up painful elongated toenails 1 through 5 right and left    HPI:   Follow-up elongated toenails both feet. Callus tissue both feet. No open wounds. No recent injury or trauma. No additional pedal complaints today. ROS:  Const: Denies constitutional symptoms  Musculo: Denies symptoms other than stated above  Skin: Denies symptoms other than stated above       Current Outpatient Medications:     terbinafine (LAMISIL) 1 % cream, Apply affected area topically 2 times daily. , Disp: 42 g, Rfl: 1    clotrimazole-betamethasone (LOTRISONE) 1-0.05 % cream, Apply topically 2 times daily. , Disp: 45 g, Rfl: 1    diclofenac (VOLTAREN) 50 MG EC tablet, , Disp: , Rfl:     LORazepam (ATIVAN) 0.5 MG tablet, TAKE 1 TABLET BY MOUTH EVERY DAY, Disp: , Rfl:     levocetirizine (XYZAL) 5 MG tablet, Take by mouth, Disp: , Rfl:     pantoprazole (PROTONIX) 40 MG tablet, , Disp: , Rfl:     losartan (COZAAR) 50 MG tablet, , Disp: , Rfl:     atorvastatin (LIPITOR) 10 MG tablet, , Disp: , Rfl:   No Known Allergies    No past medical history on file. Vitals:    11/30/23 0841   Weight: 106.1 kg (234 lb)   Height: 1.905 m (6' 3\")           Orthopedic history: TKA Dr. Lupe Neely left July 2019/ October 2019. Focused Lower Extremity Physical Exam:    Neurovascular examination:    Dorsalis Pedis palpable bilateral.  Posterior tibialis palpable bilateral.    Capillary Refill Time:  Immediate return  Hair growth:  Symmetrical and bilateral   Skin:  Not atrophic  Edema: Mild edema bilateral feet or ankles. Neurologic:  Light touch diminished bilateral.      Musculoskeletal/ Orthopedic examination:    Equinis: Absent bilateral  Dorsiflexion, plantarflexion, inversion, eversion bilateral 5 out of 5 muscle strength  Wiggling toes pain-free.

## 2024-02-23 ENCOUNTER — PROCEDURE VISIT (OUTPATIENT)
Dept: PODIATRY | Age: 74
End: 2024-02-23

## 2024-02-23 DIAGNOSIS — G60.8 HEREDITARY SENSORY NEUROPATHY: ICD-10-CM

## 2024-02-23 DIAGNOSIS — L84 CORNS AND CALLOSITIES: ICD-10-CM

## 2024-02-23 DIAGNOSIS — B35.3 TINEA PEDIS OF BOTH FEET: ICD-10-CM

## 2024-02-23 DIAGNOSIS — B35.1 TINEA UNGUIUM: Primary | ICD-10-CM

## 2024-02-23 NOTE — PROGRESS NOTES
Kale Camargo is here today for nail care. his PCP is Joseph Lynn MD last OV was 12/18/2023.     CC:    Follow-up painful elongated toenails 1 through 5 right and left    HPI:   Follow-up elongated toenails both feet.  No open wounds.  Callus tissue both feet.  No additional pedal complaints.      ROS:  Const: Denies constitutional symptoms  Musculo: Denies symptoms other than stated above  Skin: Denies symptoms other than stated above       Current Outpatient Medications:     terbinafine (LAMISIL) 1 % cream, Apply affected area topically 2 times daily., Disp: 42 g, Rfl: 1    clotrimazole-betamethasone (LOTRISONE) 1-0.05 % cream, Apply topically 2 times daily., Disp: 45 g, Rfl: 1    diclofenac (VOLTAREN) 50 MG EC tablet, , Disp: , Rfl:     LORazepam (ATIVAN) 0.5 MG tablet, TAKE 1 TABLET BY MOUTH EVERY DAY, Disp: , Rfl:     levocetirizine (XYZAL) 5 MG tablet, Take by mouth, Disp: , Rfl:     pantoprazole (PROTONIX) 40 MG tablet, , Disp: , Rfl:     losartan (COZAAR) 50 MG tablet, , Disp: , Rfl:     atorvastatin (LIPITOR) 10 MG tablet, , Disp: , Rfl:   No Known Allergies    No past medical history on file.        There were no vitals filed for this visit.          Orthopedic history: TKA Dr. Galdamez left July 2019/ October 2019.    Focused Lower Extremity Physical Exam:    Neurovascular examination:    Dorsalis Pedis palpable bilateral.  Posterior tibialis palpable bilateral.    Capillary Refill Time:  Immediate return  Hair growth:  Symmetrical and bilateral   Skin:  Not atrophic  Edema: Mild edema bilateral feet or ankles.  Neurologic:  Light touch diminished bilateral.      Musculoskeletal/ Orthopedic examination:    Equinis: Absent bilateral  Dorsiflexion, plantarflexion, inversion, eversion bilateral 5 out of 5 muscle strength  Wiggling toes pain-free.  Negative Homans.    Dermatology examination:    Toenails 1-5 right and left thickened, elongated, dystrophic, mycotic with subungual debris.  Webspaces 1-4 clean dry

## 2024-04-30 ENCOUNTER — PROCEDURE VISIT (OUTPATIENT)
Dept: PODIATRY | Age: 74
End: 2024-04-30
Payer: COMMERCIAL

## 2024-04-30 DIAGNOSIS — L84 CORNS AND CALLOSITIES: ICD-10-CM

## 2024-04-30 DIAGNOSIS — G60.8 HEREDITARY SENSORY NEUROPATHY: ICD-10-CM

## 2024-04-30 DIAGNOSIS — B35.1 TINEA UNGUIUM: Primary | ICD-10-CM

## 2024-04-30 PROCEDURE — 11056 PARNG/CUTG B9 HYPRKR LES 2-4: CPT | Performed by: PODIATRIST

## 2024-04-30 PROCEDURE — 11721 DEBRIDE NAIL 6 OR MORE: CPT | Performed by: PODIATRIST

## 2024-04-30 NOTE — PROGRESS NOTES
debris.  Webspaces 1-4 clean dry intact bilateral.  Hyperkeratotic tissue noted plantar first metatarsal head and IPJ great toe bilateral.  No open wounds.    Assessment and Plan:  Kale was seen today for callouses and nail problem.    Diagnoses and all orders for this visit:    Tinea unguium    Corns and callosities    Hereditary sensory neuropathy      Follow-up foot and ankle exam  Manual debridement with standard technique toenails 1 through 5 right and left in thickness and length.  Patient tolerated procedure well.  Paring hyperkeratotic tissue IPJ great toe bilateral and plantar first metatarsal bilateral.  Lotion daily.  Avoid barefoot.  Follow-up 2 months.      Return in about 2 months (around 6/30/2024).      Seen By:  Manuel Mederos DPM      Document was created using voice recognition software.  Note was reviewed, however may contain grammatical errors.

## 2024-07-23 ENCOUNTER — PROCEDURE VISIT (OUTPATIENT)
Dept: PODIATRY | Age: 74
End: 2024-07-23
Payer: COMMERCIAL

## 2024-07-23 VITALS — HEIGHT: 75 IN | WEIGHT: 235 LBS | BODY MASS INDEX: 29.22 KG/M2

## 2024-07-23 DIAGNOSIS — B35.3 TINEA PEDIS OF BOTH FEET: ICD-10-CM

## 2024-07-23 DIAGNOSIS — G60.8 HEREDITARY SENSORY NEUROPATHY: ICD-10-CM

## 2024-07-23 DIAGNOSIS — B35.1 TINEA UNGUIUM: Primary | ICD-10-CM

## 2024-07-23 DIAGNOSIS — L84 CORNS AND CALLOSITIES: ICD-10-CM

## 2024-07-23 PROCEDURE — 11721 DEBRIDE NAIL 6 OR MORE: CPT | Performed by: PODIATRIST

## 2024-07-23 PROCEDURE — 11056 PARNG/CUTG B9 HYPRKR LES 2-4: CPT | Performed by: PODIATRIST

## 2024-07-23 NOTE — PROGRESS NOTES
Patient in today for nail care. Patient does not have any complaints of pain at this time. Patient's PCP is Joseph Lynn MD and date of last ov was about the end of April 2024.    Margret Plummer MA     CC:    Follow-up painful elongated toenails 1 through 5 right and left    HPI:   Follow-up foot ankle exam.  Long toenails and callus tissue both feet.  Did have recent surgery [facility] by Dr. Quintana.  Progressing well.  Denies calf pain.  Denies nausea vomiting fever chills shortness of breath.  No additional pedal complaints today.           ROS:  Const: Denies constitutional symptoms  Musculo: Denies symptoms other than stated above  Skin: Denies symptoms other than stated above       Current Outpatient Medications:     terbinafine (LAMISIL) 1 % cream, Apply affected area topically 2 times daily., Disp: 42 g, Rfl: 1    clotrimazole-betamethasone (LOTRISONE) 1-0.05 % cream, Apply topically 2 times daily., Disp: 45 g, Rfl: 1    diclofenac (VOLTAREN) 50 MG EC tablet, , Disp: , Rfl:     LORazepam (ATIVAN) 0.5 MG tablet, TAKE 1 TABLET BY MOUTH EVERY DAY, Disp: , Rfl:     levocetirizine (XYZAL) 5 MG tablet, Take by mouth, Disp: , Rfl:     pantoprazole (PROTONIX) 40 MG tablet, , Disp: , Rfl:     losartan (COZAAR) 50 MG tablet, , Disp: , Rfl:     atorvastatin (LIPITOR) 10 MG tablet, , Disp: , Rfl:   No Known Allergies    No past medical history on file.        Vitals:    07/23/24 0922   Weight: 106.6 kg (235 lb)   Height: 1.905 m (6' 3\")             Orthopedic history: TKA Dr. Galdamez left July 2019/ October 2019.  Back surgery 2024 Dr. Travis Quintana [facility] [address]    Focused Lower Extremity Physical Exam:    Neurovascular examination:    Dorsalis Pedis palpable bilateral.  Posterior tibialis palpable bilateral.    Capillary Refill Time:  Immediate return  Hair growth:  Symmetrical and bilateral   Skin:  Not atrophic  Edema: Mild edema bilateral feet or ankles.  Neurologic:  Light touch diminished bilateral.

## 2024-09-24 ENCOUNTER — PROCEDURE VISIT (OUTPATIENT)
Dept: PODIATRY | Age: 74
End: 2024-09-24
Payer: COMMERCIAL

## 2024-09-24 VITALS — HEIGHT: 75 IN | BODY MASS INDEX: 29.22 KG/M2 | WEIGHT: 235 LBS

## 2024-09-24 DIAGNOSIS — G60.8 HEREDITARY SENSORY NEUROPATHY: ICD-10-CM

## 2024-09-24 DIAGNOSIS — B35.1 TINEA UNGUIUM: Primary | ICD-10-CM

## 2024-09-24 DIAGNOSIS — L84 CORNS AND CALLOSITIES: ICD-10-CM

## 2024-09-24 PROCEDURE — 11721 DEBRIDE NAIL 6 OR MORE: CPT | Performed by: PODIATRIST

## 2024-09-24 PROCEDURE — 11056 PARNG/CUTG B9 HYPRKR LES 2-4: CPT | Performed by: PODIATRIST

## 2024-12-20 ENCOUNTER — PROCEDURE VISIT (OUTPATIENT)
Dept: PODIATRY | Age: 74
End: 2024-12-20

## 2024-12-20 VITALS — HEIGHT: 75 IN | BODY MASS INDEX: 29.59 KG/M2 | WEIGHT: 238 LBS

## 2024-12-20 DIAGNOSIS — G60.8 HEREDITARY SENSORY NEUROPATHY: ICD-10-CM

## 2024-12-20 DIAGNOSIS — B35.3 TINEA PEDIS OF BOTH FEET: ICD-10-CM

## 2024-12-20 DIAGNOSIS — L84 CORNS AND CALLOSITIES: ICD-10-CM

## 2024-12-20 DIAGNOSIS — B35.1 TINEA UNGUIUM: Primary | ICD-10-CM

## 2024-12-20 NOTE — PROGRESS NOTES
examination:    Toenails 1-5 right and left thickened, elongated, dystrophic, mycotic with subungual debris.  Webspaces 1-4 clean dry intact bilateral.  Hyperkeratotic tissue IPJ great toe bilateral and plantar first metatarsal bilateral.        Assessment and Plan:  Kale was seen today for nail problem and callouses.    Diagnoses and all orders for this visit:    Tinea unguium    Corns and callosities    Hereditary sensory neuropathy    Tinea pedis of both feet      Follow-up foot and ankle exam  Manual debridement with standard technique toenails 1 through 5 right and left in thickness and length.  Patient tolerated procedure well.  Paring hyperkeratotic tissue plantar first metatarsal head and IPJ great toe bilateral.  Follow-up in office 2 months.    Return in about 2 months (around 2/20/2025).      Seen By:  Manuel Mederos DPM      Document was created using voice recognition software.  Note was reviewed, however may contain grammatical errors.

## 2025-02-21 ENCOUNTER — PROCEDURE VISIT (OUTPATIENT)
Dept: PODIATRY | Age: 75
End: 2025-02-21

## 2025-02-21 VITALS — BODY MASS INDEX: 29.75 KG/M2 | WEIGHT: 238 LBS | TEMPERATURE: 32.2 F

## 2025-02-21 DIAGNOSIS — B35.1 TINEA UNGUIUM: Primary | ICD-10-CM

## 2025-02-21 DIAGNOSIS — B35.3 TINEA PEDIS OF BOTH FEET: ICD-10-CM

## 2025-02-21 DIAGNOSIS — L84 CORNS AND CALLOSITIES: ICD-10-CM

## 2025-02-21 DIAGNOSIS — G60.8 HEREDITARY SENSORY NEUROPATHY: ICD-10-CM

## 2025-02-21 NOTE — PROGRESS NOTES
Patient in today for nail care. Patient does not have any complaints of pain at this time. Patient's PCP is Joseph Lynn MD and date of last ov 12/21/2024.        CC:    Follow-up painful elongated toenails 1 through 5 right and left    HPI:   Follow-up foot ankle exam.  Elongated toenails both feet.  No open wounds no recent injury or trauma.  Does state callus tissue both feet.    ROS:  Const: Denies constitutional symptoms  Musculo: Denies symptoms other than stated above  Skin: Denies symptoms other than stated above       Current Outpatient Medications:     terbinafine (LAMISIL) 1 % cream, Apply affected area topically 2 times daily., Disp: 42 g, Rfl: 1    clotrimazole-betamethasone (LOTRISONE) 1-0.05 % cream, Apply topically 2 times daily., Disp: 45 g, Rfl: 1    diclofenac (VOLTAREN) 50 MG EC tablet, , Disp: , Rfl:     LORazepam (ATIVAN) 0.5 MG tablet, TAKE 1 TABLET BY MOUTH EVERY DAY, Disp: , Rfl:     levocetirizine (XYZAL) 5 MG tablet, Take by mouth, Disp: , Rfl:     pantoprazole (PROTONIX) 40 MG tablet, , Disp: , Rfl:     losartan (COZAAR) 50 MG tablet, , Disp: , Rfl:     atorvastatin (LIPITOR) 10 MG tablet, , Disp: , Rfl:   No Known Allergies    No past medical history on file.        Vitals:    02/21/25 0952   Temp: (!) 32.2 °F (0.1 °C)   Weight: 108 kg (238 lb)             Orthopedic history: TKA Dr. Galdamez left July 2019/ October 2019.  Back surgery 2024 Dr. Travis Quintana [facility]    Focused Lower Extremity Physical Exam:    Neurovascular examination:    Dorsalis Pedis palpable bilateral.  Posterior tibialis palpable bilateral.    Capillary Refill Time:  Immediate return  Hair growth:  Symmetrical and bilateral   Skin:  Not atrophic  Edema: Mild edema bilateral feet or ankles.  Neurologic:  Light touch diminished bilateral.      Musculoskeletal/ Orthopedic examination:    Equinis: Absent bilateral  Dorsiflexion, plantarflexion, inversion, eversion bilateral 5 out of 5 muscle strength  No open

## 2025-05-15 ENCOUNTER — PROCEDURE VISIT (OUTPATIENT)
Dept: PODIATRY | Age: 75
End: 2025-05-15
Payer: MEDICARE

## 2025-05-15 VITALS — BODY MASS INDEX: 29.75 KG/M2 | WEIGHT: 238 LBS

## 2025-05-15 DIAGNOSIS — B35.1 TINEA UNGUIUM: Primary | ICD-10-CM

## 2025-05-15 DIAGNOSIS — L84 CORNS AND CALLOSITIES: ICD-10-CM

## 2025-05-15 DIAGNOSIS — B35.3 TINEA PEDIS OF BOTH FEET: ICD-10-CM

## 2025-05-15 DIAGNOSIS — G60.8 HEREDITARY SENSORY NEUROPATHY: ICD-10-CM

## 2025-05-15 PROCEDURE — 11056 PARNG/CUTG B9 HYPRKR LES 2-4: CPT | Performed by: PODIATRIST

## 2025-05-15 PROCEDURE — 11721 DEBRIDE NAIL 6 OR MORE: CPT | Performed by: PODIATRIST

## 2025-05-15 NOTE — PROGRESS NOTES
strength  No open wounds    Dermatology examination:    Toenails 1-5 right and left thickened, elongated, dystrophic, mycotic with subungual debris.  Webspaces 1-4 clean dry intact bilateral.  No open wounds  Hyperkeratotic tissue IPJ great toe bilateral plantar first metatarsal bilateral.      Assessment and Plan:  Kale was seen today for nail problem and callouses.    Diagnoses and all orders for this visit:    Tinea unguium    Corns and callosities    Hereditary sensory neuropathy    Tinea pedis of both feet      Follow-up foot and ankle exam  Manual debridement with standard technique toenails 1 through 5 right and left in thickness and length.  Patient tolerated procedure well.  Paring hyperkeratotic tissue first metatarsal head and IPJ great toe bilateral.  He is going out of town will be on a  cruise.  I will see him when he returns.  Follow-up 2 months    No follow-ups on file.      Seen By:  Manuel Mederos DPM      Document was created using voice recognition software.  Note was reviewed, however may contain grammatical errors.

## 2025-07-22 ENCOUNTER — PROCEDURE VISIT (OUTPATIENT)
Dept: PODIATRY | Age: 75
End: 2025-07-22
Payer: MEDICARE

## 2025-07-22 VITALS — WEIGHT: 238 LBS | TEMPERATURE: 98.1 F | BODY MASS INDEX: 29.75 KG/M2

## 2025-07-22 DIAGNOSIS — B35.1 TINEA UNGUIUM: Primary | ICD-10-CM

## 2025-07-22 DIAGNOSIS — B35.3 TINEA PEDIS OF BOTH FEET: ICD-10-CM

## 2025-07-22 DIAGNOSIS — L84 CORNS AND CALLOSITIES: ICD-10-CM

## 2025-07-22 DIAGNOSIS — G60.8 HEREDITARY SENSORY NEUROPATHY: ICD-10-CM

## 2025-07-22 PROCEDURE — 11056 PARNG/CUTG B9 HYPRKR LES 2-4: CPT | Performed by: PODIATRIST

## 2025-07-22 PROCEDURE — 11721 DEBRIDE NAIL 6 OR MORE: CPT | Performed by: PODIATRIST

## 2025-07-22 NOTE — PROGRESS NOTES
Here for routine nail care no c/o  Joseph Lynn MD  LOV 5/8/25    Electronically signed by Manuel Mederos DPM on 7/22/2025 at 10:40 AM      CC:    Follow-up painful elongated toenails 1 through 5 right and left    HPI:   Follow-up foot and ankle exam.  Elongated toenails both feet.  No open wounds.  No recent injury or trauma.      ROS:  Const: Denies constitutional symptoms  Musculo: Denies symptoms other than stated above  Skin: Denies symptoms other than stated above       Current Outpatient Medications:     terbinafine (LAMISIL) 1 % cream, Apply affected area topically 2 times daily., Disp: 42 g, Rfl: 1    clotrimazole-betamethasone (LOTRISONE) 1-0.05 % cream, Apply topically 2 times daily., Disp: 45 g, Rfl: 1    diclofenac (VOLTAREN) 50 MG EC tablet, , Disp: , Rfl:     LORazepam (ATIVAN) 0.5 MG tablet, TAKE 1 TABLET BY MOUTH EVERY DAY, Disp: , Rfl:     levocetirizine (XYZAL) 5 MG tablet, Take by mouth, Disp: , Rfl:     pantoprazole (PROTONIX) 40 MG tablet, , Disp: , Rfl:     losartan (COZAAR) 50 MG tablet, , Disp: , Rfl:     atorvastatin (LIPITOR) 10 MG tablet, , Disp: , Rfl:   No Known Allergies    No past medical history on file.        Vitals:    07/22/25 0856   Temp: 98.1 °F (36.7 °C)   TempSrc: Temporal   Weight: 108 kg (238 lb)             Orthopedic history: TKA Dr. Galdamez left July 2019/ October 2019.  Back surgery 2024 Dr. Travis Quintana [facility]    Focused Lower Extremity Physical Exam:    Neurovascular examination:    Dorsalis Pedis palpable bilateral.  Posterior tibialis palpable bilateral.    Capillary Refill Time:  Immediate return  Hair growth:  Symmetrical and bilateral   Skin:  Not atrophic  Edema: Mild edema bilateral feet or ankles.  Neurologic:  Light touch diminished bilateral.      Musculoskeletal/ Orthopedic examination:    Equinis: Absent bilateral  Dorsiflexion, plantarflexion, inversion, eversion bilateral 5 out of 5 muscle strength  No pain foot or ankle    Dermatology